# Patient Record
Sex: FEMALE | Race: WHITE | HISPANIC OR LATINO
[De-identification: names, ages, dates, MRNs, and addresses within clinical notes are randomized per-mention and may not be internally consistent; named-entity substitution may affect disease eponyms.]

---

## 2020-11-19 PROBLEM — Z00.00 ENCOUNTER FOR PREVENTIVE HEALTH EXAMINATION: Status: ACTIVE | Noted: 2020-11-19

## 2021-01-05 ENCOUNTER — APPOINTMENT (OUTPATIENT)
Dept: MATERNAL FETAL MEDICINE | Facility: CLINIC | Age: 36
End: 2021-01-05

## 2021-01-05 ENCOUNTER — APPOINTMENT (OUTPATIENT)
Dept: ANTEPARTUM | Facility: CLINIC | Age: 36
End: 2021-01-05

## 2021-01-13 ENCOUNTER — APPOINTMENT (OUTPATIENT)
Dept: OBGYN | Facility: CLINIC | Age: 36
End: 2021-01-13

## 2021-04-02 ENCOUNTER — APPOINTMENT (OUTPATIENT)
Dept: ANTEPARTUM | Facility: CLINIC | Age: 36
End: 2021-04-02
Payer: MEDICAID

## 2021-04-02 ENCOUNTER — LABORATORY RESULT (OUTPATIENT)
Age: 36
End: 2021-04-02

## 2021-04-02 ENCOUNTER — APPOINTMENT (OUTPATIENT)
Dept: MATERNAL FETAL MEDICINE | Facility: CLINIC | Age: 36
End: 2021-04-02
Payer: MEDICAID

## 2021-04-02 ENCOUNTER — NON-APPOINTMENT (OUTPATIENT)
Age: 36
End: 2021-04-02

## 2021-04-02 ENCOUNTER — ASOB RESULT (OUTPATIENT)
Age: 36
End: 2021-04-02

## 2021-04-02 ENCOUNTER — OUTPATIENT (OUTPATIENT)
Dept: OUTPATIENT SERVICES | Facility: HOSPITAL | Age: 36
LOS: 1 days | End: 2021-04-02
Payer: MEDICAID

## 2021-04-02 ENCOUNTER — TRANSCRIPTION ENCOUNTER (OUTPATIENT)
Age: 36
End: 2021-04-02

## 2021-04-02 VITALS
OXYGEN SATURATION: 100 % | HEART RATE: 101 BPM | DIASTOLIC BLOOD PRESSURE: 85 MMHG | SYSTOLIC BLOOD PRESSURE: 130 MMHG | WEIGHT: 157.25 LBS

## 2021-04-02 DIAGNOSIS — Z3A.24 24 WEEKS GESTATION OF PREGNANCY: ICD-10-CM

## 2021-04-02 DIAGNOSIS — O09.899 SUPERVISION OF OTHER HIGH RISK PREGNANCIES, UNSPECIFIED TRIMESTER: ICD-10-CM

## 2021-04-02 DIAGNOSIS — O10.012 PRE-EXISTING ESSENTIAL HYPERTENSION COMPLICATING PREGNANCY, SECOND TRIMESTER: ICD-10-CM

## 2021-04-02 DIAGNOSIS — O09.212 SUPERVISION OF PREGNANCY WITH HISTORY OF PRE-TERM LABOR, SECOND TRIMESTER: ICD-10-CM

## 2021-04-02 DIAGNOSIS — Z36.2 ENCOUNTER FOR OTHER ANTENATAL SCREENING FOLLOW-UP: ICD-10-CM

## 2021-04-02 LAB
BILIRUB UR QL STRIP: NORMAL
GLUCOSE UR-MCNC: NORMAL
HCG UR QL: 0.2 EU/DL
HGB UR QL STRIP.AUTO: NORMAL
KETONES UR-MCNC: 40
LEUKOCYTE ESTERASE UR QL STRIP: NORMAL
NITRITE UR QL STRIP: NORMAL
PH UR STRIP: 6
PROT UR STRIP-MCNC: NORMAL
SP GR UR STRIP: 1.02

## 2021-04-02 PROCEDURE — 76805 OB US >/= 14 WKS SNGL FETUS: CPT

## 2021-04-02 PROCEDURE — 86850 RBC ANTIBODY SCREEN: CPT

## 2021-04-02 PROCEDURE — 99203 OFFICE O/P NEW LOW 30 MIN: CPT | Mod: GC

## 2021-04-02 PROCEDURE — 76805 OB US >/= 14 WKS SNGL FETUS: CPT | Mod: 26

## 2021-04-02 PROCEDURE — 86765 RUBEOLA ANTIBODY: CPT

## 2021-04-02 PROCEDURE — 85027 COMPLETE CBC AUTOMATED: CPT

## 2021-04-02 PROCEDURE — 86901 BLOOD TYPING SEROLOGIC RH(D): CPT

## 2021-04-02 PROCEDURE — G0463: CPT

## 2021-04-02 PROCEDURE — 86900 BLOOD TYPING SEROLOGIC ABO: CPT

## 2021-04-02 PROCEDURE — 76817 TRANSVAGINAL US OBSTETRIC: CPT

## 2021-04-02 PROCEDURE — 76817 TRANSVAGINAL US OBSTETRIC: CPT | Mod: 26

## 2021-04-03 LAB
HCT VFR BLD CALC: 35.8 % — SIGNIFICANT CHANGE UP (ref 34.5–45)
HGB BLD-MCNC: 11.2 G/DL — LOW (ref 11.5–15.5)
MCHC RBC-ENTMCNC: 28 PG — SIGNIFICANT CHANGE UP (ref 27–34)
MCHC RBC-ENTMCNC: 31.3 GM/DL — LOW (ref 32–36)
MCV RBC AUTO: 89.5 FL — SIGNIFICANT CHANGE UP (ref 80–100)
MEV IGG SER-ACNC: >300 AU/ML — SIGNIFICANT CHANGE UP
MEV IGG+IGM SER-IMP: POSITIVE — SIGNIFICANT CHANGE UP
PLATELET # BLD AUTO: 234 K/UL — SIGNIFICANT CHANGE UP (ref 150–400)
RBC # BLD: 4 M/UL — SIGNIFICANT CHANGE UP (ref 3.8–5.2)
RBC # FLD: 14.7 % — HIGH (ref 10.3–14.5)
WBC # BLD: 6.93 K/UL — SIGNIFICANT CHANGE UP (ref 3.8–10.5)
WBC # FLD AUTO: 6.93 K/UL — SIGNIFICANT CHANGE UP (ref 3.8–10.5)

## 2021-04-04 ENCOUNTER — NON-APPOINTMENT (OUTPATIENT)
Age: 36
End: 2021-04-04

## 2021-04-05 ENCOUNTER — NON-APPOINTMENT (OUTPATIENT)
Age: 36
End: 2021-04-05

## 2021-04-06 ENCOUNTER — NON-APPOINTMENT (OUTPATIENT)
Age: 36
End: 2021-04-06

## 2021-04-14 DIAGNOSIS — O09.92 SUPERVISION OF HIGH RISK PREGNANCY, UNSPECIFIED, SECOND TRIMESTER: ICD-10-CM

## 2021-04-15 ENCOUNTER — NON-APPOINTMENT (OUTPATIENT)
Age: 36
End: 2021-04-15

## 2021-04-15 ENCOUNTER — APPOINTMENT (OUTPATIENT)
Dept: CARDIOLOGY | Facility: HOSPITAL | Age: 36
End: 2021-04-15

## 2021-04-15 VITALS
HEART RATE: 105 BPM | SYSTOLIC BLOOD PRESSURE: 133 MMHG | RESPIRATION RATE: 16 BRPM | WEIGHT: 157 LBS | OXYGEN SATURATION: 99 % | DIASTOLIC BLOOD PRESSURE: 82 MMHG | BODY MASS INDEX: 30.82 KG/M2 | HEIGHT: 60 IN

## 2021-04-15 DIAGNOSIS — Z82.49 FAMILY HISTORY OF ISCHEMIC HEART DISEASE AND OTHER DISEASES OF THE CIRCULATORY SYSTEM: ICD-10-CM

## 2021-04-15 DIAGNOSIS — E78.00 PURE HYPERCHOLESTEROLEMIA, UNSPECIFIED: ICD-10-CM

## 2021-04-15 DIAGNOSIS — Z83.42 FAMILY HISTORY OF FAMILIAL HYPERCHOLESTEROLEMIA: ICD-10-CM

## 2021-04-15 DIAGNOSIS — Z78.9 OTHER SPECIFIED HEALTH STATUS: ICD-10-CM

## 2021-04-15 RX ORDER — ASPIRIN ENTERIC COATED TABLETS 81 MG 81 MG/1
81 TABLET, DELAYED RELEASE ORAL DAILY
Refills: 0 | Status: ACTIVE | COMMUNITY

## 2021-04-15 RX ORDER — ELASTIC BANDAGE 2"X2.2YD
BANDAGE TOPICAL
Refills: 0 | Status: ACTIVE | COMMUNITY

## 2021-04-15 NOTE — ASSESSMENT
[FreeTextEntry1] : 35  F with Familial hypercholesteremia, hx of pre-eclampsia and cHTN presenting to establish care\par \par 1. FH: \par - strong suspicion given elevated , and family of hypercholesteremia in mother \par - currently in 2nd trimester of pregnancy and plans to breastfeed, will defer statin for now \par - recommended lipid screening of her first degree relatives- siblings and children. \par - refer to Lipid clinic with Dr. Aguirre\par \par 2. Hx of preeclampsia:\par /85 in office today. \par Monitor BP for now, no need for antihypertensives at this time\par \par F/U in clinic in 1 month.  \par

## 2021-04-15 NOTE — PHYSICAL EXAM
[General Appearance - Well Developed] : well developed [General Appearance - Well Nourished] : well nourished [Normal Conjunctiva] : the conjunctiva exhibited no abnormalities [Normal Oral Mucosa] : normal oral mucosa [Normal Oropharynx] : normal oropharynx [Heart Rate And Rhythm] : heart rate and rhythm were normal [Heart Sounds] : normal S1 and S2 [Respiration, Rhythm And Depth] : normal respiratory rhythm and effort [Auscultation Breath Sounds / Voice Sounds] : lungs were clear to auscultation bilaterally [Nail Clubbing] : no clubbing of the fingernails [Abnormal Walk] : normal gait [Skin Turgor] : normal skin turgor [] : no rash [Oriented To Time, Place, And Person] : oriented to person, place, and time [Affect] : the affect was normal [FreeTextEntry1] : no JVD

## 2021-04-21 ENCOUNTER — NON-APPOINTMENT (OUTPATIENT)
Age: 36
End: 2021-04-21

## 2021-04-23 ENCOUNTER — NON-APPOINTMENT (OUTPATIENT)
Age: 36
End: 2021-04-23

## 2021-04-23 ENCOUNTER — LABORATORY RESULT (OUTPATIENT)
Age: 36
End: 2021-04-23

## 2021-04-23 ENCOUNTER — OUTPATIENT (OUTPATIENT)
Dept: OUTPATIENT SERVICES | Facility: HOSPITAL | Age: 36
LOS: 1 days | End: 2021-04-23
Payer: MEDICAID

## 2021-04-23 ENCOUNTER — APPOINTMENT (OUTPATIENT)
Dept: MATERNAL FETAL MEDICINE | Facility: CLINIC | Age: 36
End: 2021-04-23
Payer: MEDICAID

## 2021-04-23 ENCOUNTER — ASOB RESULT (OUTPATIENT)
Age: 36
End: 2021-04-23

## 2021-04-23 ENCOUNTER — APPOINTMENT (OUTPATIENT)
Dept: ANTEPARTUM | Facility: CLINIC | Age: 36
End: 2021-04-23
Payer: MEDICAID

## 2021-04-23 VITALS
SYSTOLIC BLOOD PRESSURE: 142 MMHG | HEART RATE: 106 BPM | BODY MASS INDEX: 31.31 KG/M2 | OXYGEN SATURATION: 98 % | DIASTOLIC BLOOD PRESSURE: 82 MMHG | HEIGHT: 60 IN | WEIGHT: 159.5 LBS

## 2021-04-23 DIAGNOSIS — O09.899 SUPERVISION OF OTHER HIGH RISK PREGNANCIES, UNSPECIFIED TRIMESTER: ICD-10-CM

## 2021-04-23 DIAGNOSIS — O09.90 SUPERVISION OF HIGH RISK PREGNANCY, UNSPECIFIED, UNSPECIFIED TRIMESTER: ICD-10-CM

## 2021-04-23 PROCEDURE — 84550 ASSAY OF BLOOD/URIC ACID: CPT

## 2021-04-23 PROCEDURE — 90471 IMMUNIZATION ADMIN: CPT | Mod: NC

## 2021-04-23 PROCEDURE — 76816 OB US FOLLOW-UP PER FETUS: CPT | Mod: 26

## 2021-04-23 PROCEDURE — 81003 URINALYSIS AUTO W/O SCOPE: CPT

## 2021-04-23 PROCEDURE — 83615 LACTATE (LD) (LDH) ENZYME: CPT

## 2021-04-23 PROCEDURE — G0463: CPT

## 2021-04-23 PROCEDURE — 82950 GLUCOSE TEST: CPT

## 2021-04-23 PROCEDURE — 90471 IMMUNIZATION ADMIN: CPT

## 2021-04-23 PROCEDURE — 85027 COMPLETE CBC AUTOMATED: CPT

## 2021-04-23 PROCEDURE — 99212 OFFICE O/P EST SF 10 MIN: CPT | Mod: 25,GC

## 2021-04-23 PROCEDURE — 80053 COMPREHEN METABOLIC PANEL: CPT

## 2021-04-23 RX ADMIN — Medication 0 UNIT: at 00:00

## 2021-04-24 LAB
ALBUMIN SERPL ELPH-MCNC: 3.7 G/DL — SIGNIFICANT CHANGE UP (ref 3.3–5)
ALP SERPL-CCNC: 64 U/L — SIGNIFICANT CHANGE UP (ref 40–120)
ALT FLD-CCNC: 15 U/L — SIGNIFICANT CHANGE UP (ref 10–45)
ANION GAP SERPL CALC-SCNC: 13 MMOL/L — SIGNIFICANT CHANGE UP (ref 5–17)
AST SERPL-CCNC: 21 U/L — SIGNIFICANT CHANGE UP (ref 10–40)
BILIRUB SERPL-MCNC: <0.2 MG/DL — SIGNIFICANT CHANGE UP (ref 0.2–1.2)
BUN SERPL-MCNC: 6 MG/DL — LOW (ref 7–23)
CALCIUM SERPL-MCNC: 9.4 MG/DL — SIGNIFICANT CHANGE UP (ref 8.4–10.5)
CHLORIDE SERPL-SCNC: 105 MMOL/L — SIGNIFICANT CHANGE UP (ref 96–108)
CO2 SERPL-SCNC: 18 MMOL/L — LOW (ref 22–31)
CREAT SERPL-MCNC: 0.4 MG/DL — LOW (ref 0.5–1.3)
GLUCOSE 1H P MEAL SERPL-MCNC: 123 MG/DL — SIGNIFICANT CHANGE UP (ref 70–134)
GLUCOSE SERPL-MCNC: 124 MG/DL — HIGH (ref 70–99)
HCT VFR BLD CALC: 32.7 % — LOW (ref 34.5–45)
HGB BLD-MCNC: 10.2 G/DL — LOW (ref 11.5–15.5)
LDH SERPL L TO P-CCNC: 112 U/L — SIGNIFICANT CHANGE UP (ref 50–242)
MCHC RBC-ENTMCNC: 28.3 PG — SIGNIFICANT CHANGE UP (ref 27–34)
MCHC RBC-ENTMCNC: 31.2 GM/DL — LOW (ref 32–36)
MCV RBC AUTO: 90.6 FL — SIGNIFICANT CHANGE UP (ref 80–100)
PLATELET # BLD AUTO: 200 K/UL — SIGNIFICANT CHANGE UP (ref 150–400)
POTASSIUM SERPL-MCNC: 3.6 MMOL/L — SIGNIFICANT CHANGE UP (ref 3.5–5.3)
POTASSIUM SERPL-SCNC: 3.6 MMOL/L — SIGNIFICANT CHANGE UP (ref 3.5–5.3)
PROT SERPL-MCNC: 6.2 G/DL — SIGNIFICANT CHANGE UP (ref 6–8.3)
RBC # BLD: 3.61 M/UL — LOW (ref 3.8–5.2)
RBC # FLD: 15 % — HIGH (ref 10.3–14.5)
SODIUM SERPL-SCNC: 136 MMOL/L — SIGNIFICANT CHANGE UP (ref 135–145)
URATE SERPL-MCNC: 3.3 MG/DL — SIGNIFICANT CHANGE UP (ref 2.5–7)
WBC # BLD: 6.6 K/UL — SIGNIFICANT CHANGE UP (ref 3.8–10.5)
WBC # FLD AUTO: 6.6 K/UL — SIGNIFICANT CHANGE UP (ref 3.8–10.5)

## 2021-04-25 ENCOUNTER — NON-APPOINTMENT (OUTPATIENT)
Age: 36
End: 2021-04-25

## 2021-04-27 ENCOUNTER — APPOINTMENT (OUTPATIENT)
Dept: CARDIOLOGY | Facility: CLINIC | Age: 36
End: 2021-04-27
Payer: MEDICAID

## 2021-04-27 VITALS
RESPIRATION RATE: 16 BRPM | HEIGHT: 60 IN | OXYGEN SATURATION: 99 % | SYSTOLIC BLOOD PRESSURE: 130 MMHG | DIASTOLIC BLOOD PRESSURE: 80 MMHG | BODY MASS INDEX: 31.22 KG/M2 | WEIGHT: 159 LBS

## 2021-04-27 DIAGNOSIS — E78.1 PURE HYPERGLYCERIDEMIA: ICD-10-CM

## 2021-04-27 DIAGNOSIS — E78.5 HYPERLIPIDEMIA, UNSPECIFIED: ICD-10-CM

## 2021-04-27 PROBLEM — O09.90 HIGH RISK PREGNANCY, ANTEPARTUM: Status: RESOLVED | Noted: 2021-04-27 | Resolved: 2021-12-21

## 2021-04-27 PROCEDURE — 99072 ADDL SUPL MATRL&STAF TM PHE: CPT

## 2021-04-27 PROCEDURE — 99215 OFFICE O/P EST HI 40 MIN: CPT

## 2021-04-27 NOTE — PHYSICAL EXAM
[No Xanthelasma] : no xanthelasma [Normal] : clear lung fields, good air entry, no respiratory distress [No Skin Lesions] : no skin lesions

## 2021-04-27 NOTE — HISTORY OF PRESENT ILLNESS
[FreeTextEntry1] : Dear Christophe, \par \par I had the pleasure of seeing your patient JOSE GONZALES for a cardiometabolic/lipid consultation.\par \par As you know, She is a pleasant 35 year old female with a past family history of heart disease and a personal history of significant hypercholesterolemia -now in her third trimester of pregnancy\par ===============\par ===============\par 4/2021\par Labs on 12/2020 demonstrated: Total Cholesterol 360mg/dL HDL 58mg/dL  LDL 260mg/dL Triglycerides 211mg/dL  \par She is currently 28 weeks preganant.\par Was on lovastatin for 1 year - stopped once pregnant \par noted to have high chol for the last three years\par mother/father - high cholesterol on therapy \par no family history of premature ASCVD \par diet: no specific diet, just eating what she desires\par exercise: walks 3-4x week for 30-45min \par -----------------------\par -2021\par

## 2021-04-27 NOTE — DISCUSSION/SUMMARY
[FreeTextEntry1] : In summary,\par \par Pleasant 35 year old female with a past family history of heart disease and a personal history of significant hypercholesterolemia -now in her third trimester of pregnancy\par ===============\par ===============\par Presumed heterozygous FH type IIb now in third trimester pregnancy\par -Likely a candidate for cholestyramine; however, as she has 12 more weeks of pregnancy, will prefer to hold off on treatment at this point\par -Was counseled on remaining off statin therapy now and prior to any consideration of future pregnancies (although she does not intend to get pregnant in the future)\par -We will plan to reinitiate therapy after breast-feeding.\par -We will also plan to initiate genetic screening in family members at that time as well.\par -We will also plan to have her see our dietitian at the lipid center\par -We encourage continued exercise as she is doing 3-4 times a week\par -She will follow up with us in 6 months\par \par \par Christophe,as always, it was a pleasure to care for your patient in my office today.\par \par With kind thanks for the referral.\par \par Rajesh Aguirre MD [facility]\par Director, Preventive Cardiology & Lipidology\par [place] [facility]\par \par \par \par \par \par \par \par \par \par \par \par \par \par \par \par \par \par \par \par \par 40 minutes spent in patient encounter formulating plan and explaining all rationale for treatment plan\par \par

## 2021-04-28 ENCOUNTER — NON-APPOINTMENT (OUTPATIENT)
Age: 36
End: 2021-04-28

## 2021-04-30 RX ORDER — CHLORHEXIDINE GLUCONATE 4 %
325 (65 FE) LIQUID (ML) TOPICAL
Qty: 60 | Refills: 1 | Status: ACTIVE | COMMUNITY
Start: 2021-04-27 | End: 1900-01-01

## 2021-05-03 DIAGNOSIS — O09.90 SUPERVISION OF HIGH RISK PREGNANCY, UNSPECIFIED, UNSPECIFIED TRIMESTER: ICD-10-CM

## 2021-05-03 DIAGNOSIS — O09.219 SUPERVISION OF PREGNANCY WITH HISTORY OF PRE-TERM LABOR, UNSPECIFIED TRIMESTER: ICD-10-CM

## 2021-05-03 DIAGNOSIS — O47.00 FALSE LABOR BEFORE 37 COMPLETED WEEKS OF GESTATION, UNSPECIFIED TRIMESTER: ICD-10-CM

## 2021-05-03 DIAGNOSIS — O10.919 UNSPECIFIED PRE-EXISTING HYPERTENSION COMPLICATING PREGNANCY, UNSPECIFIED TRIMESTER: ICD-10-CM

## 2021-05-05 ENCOUNTER — NON-APPOINTMENT (OUTPATIENT)
Age: 36
End: 2021-05-05

## 2021-05-07 ENCOUNTER — NON-APPOINTMENT (OUTPATIENT)
Age: 36
End: 2021-05-07

## 2021-05-07 ENCOUNTER — OUTPATIENT (OUTPATIENT)
Dept: OUTPATIENT SERVICES | Facility: HOSPITAL | Age: 36
LOS: 1 days | End: 2021-05-07
Payer: MEDICAID

## 2021-05-07 ENCOUNTER — LABORATORY RESULT (OUTPATIENT)
Age: 36
End: 2021-05-07

## 2021-05-07 ENCOUNTER — APPOINTMENT (OUTPATIENT)
Dept: MATERNAL FETAL MEDICINE | Facility: CLINIC | Age: 36
End: 2021-05-07
Payer: MEDICAID

## 2021-05-07 VITALS
HEIGHT: 60 IN | SYSTOLIC BLOOD PRESSURE: 138 MMHG | DIASTOLIC BLOOD PRESSURE: 84 MMHG | BODY MASS INDEX: 32.2 KG/M2 | OXYGEN SATURATION: 97 % | HEART RATE: 105 BPM | WEIGHT: 164 LBS

## 2021-05-07 DIAGNOSIS — I10 ESSENTIAL (PRIMARY) HYPERTENSION: ICD-10-CM

## 2021-05-07 DIAGNOSIS — O09.899 SUPERVISION OF OTHER HIGH RISK PREGNANCIES, UNSPECIFIED TRIMESTER: ICD-10-CM

## 2021-05-07 LAB
BILIRUB UR QL STRIP: NORMAL
GLUCOSE UR-MCNC: NORMAL
HCG UR QL: 0.2 EU/DL
HGB UR QL STRIP.AUTO: NORMAL
KETONES UR-MCNC: NORMAL
LEUKOCYTE ESTERASE UR QL STRIP: NORMAL
NITRITE UR QL STRIP: NORMAL
PH UR STRIP: 7
PROT UR STRIP-MCNC: NORMAL
SP GR UR STRIP: 1.01

## 2021-05-07 PROCEDURE — 90715 TDAP VACCINE 7 YRS/> IM: CPT

## 2021-05-07 PROCEDURE — 85027 COMPLETE CBC AUTOMATED: CPT

## 2021-05-07 PROCEDURE — 84156 ASSAY OF PROTEIN URINE: CPT

## 2021-05-07 PROCEDURE — 80053 COMPREHEN METABOLIC PANEL: CPT

## 2021-05-07 PROCEDURE — 0502F SUBSEQUENT PRENATAL CARE: CPT

## 2021-05-07 PROCEDURE — 82570 ASSAY OF URINE CREATININE: CPT

## 2021-05-07 PROCEDURE — 36415 COLL VENOUS BLD VENIPUNCTURE: CPT

## 2021-05-07 PROCEDURE — G0463: CPT

## 2021-05-07 PROCEDURE — 81003 URINALYSIS AUTO W/O SCOPE: CPT

## 2021-05-07 PROCEDURE — 83615 LACTATE (LD) (LDH) ENZYME: CPT

## 2021-05-07 PROCEDURE — 90471 IMMUNIZATION ADMIN: CPT | Mod: NC

## 2021-05-07 PROCEDURE — 90471 IMMUNIZATION ADMIN: CPT

## 2021-05-08 LAB
ALBUMIN SERPL ELPH-MCNC: 4.1 G/DL — SIGNIFICANT CHANGE UP (ref 3.3–5)
ALP SERPL-CCNC: 80 U/L — SIGNIFICANT CHANGE UP (ref 40–120)
ALT FLD-CCNC: 19 U/L — SIGNIFICANT CHANGE UP (ref 10–45)
ANION GAP SERPL CALC-SCNC: 15 MMOL/L — SIGNIFICANT CHANGE UP (ref 5–17)
AST SERPL-CCNC: 23 U/L — SIGNIFICANT CHANGE UP (ref 10–40)
BILIRUB SERPL-MCNC: 0.2 MG/DL — SIGNIFICANT CHANGE UP (ref 0.2–1.2)
BUN SERPL-MCNC: 7 MG/DL — SIGNIFICANT CHANGE UP (ref 7–23)
CALCIUM SERPL-MCNC: 9.1 MG/DL — SIGNIFICANT CHANGE UP (ref 8.4–10.5)
CHLORIDE SERPL-SCNC: 103 MMOL/L — SIGNIFICANT CHANGE UP (ref 96–108)
CO2 SERPL-SCNC: 18 MMOL/L — LOW (ref 22–31)
CREAT ?TM UR-MCNC: 32 MG/DL — SIGNIFICANT CHANGE UP
CREAT SERPL-MCNC: 0.43 MG/DL — LOW (ref 0.5–1.3)
GLUCOSE SERPL-MCNC: 34 MG/DL — CRITICAL LOW (ref 70–99)
HCT VFR BLD CALC: 34.1 % — LOW (ref 34.5–45)
HGB BLD-MCNC: 10.8 G/DL — LOW (ref 11.5–15.5)
LDH SERPL L TO P-CCNC: 161 U/L — SIGNIFICANT CHANGE UP (ref 50–242)
MCHC RBC-ENTMCNC: 27.6 PG — SIGNIFICANT CHANGE UP (ref 27–34)
MCHC RBC-ENTMCNC: 31.7 GM/DL — LOW (ref 32–36)
MCV RBC AUTO: 87 FL — SIGNIFICANT CHANGE UP (ref 80–100)
PLATELET # BLD AUTO: 200 K/UL — SIGNIFICANT CHANGE UP (ref 150–400)
POTASSIUM SERPL-MCNC: 4 MMOL/L — SIGNIFICANT CHANGE UP (ref 3.5–5.3)
POTASSIUM SERPL-SCNC: 4 MMOL/L — SIGNIFICANT CHANGE UP (ref 3.5–5.3)
PROT ?TM UR-MCNC: 25 MG/DL — HIGH (ref 0–12)
PROT SERPL-MCNC: 6.6 G/DL — SIGNIFICANT CHANGE UP (ref 6–8.3)
PROT/CREAT UR-RTO: 0.8 RATIO — HIGH (ref 0–0.2)
RBC # BLD: 3.92 M/UL — SIGNIFICANT CHANGE UP (ref 3.8–5.2)
RBC # FLD: 14.6 % — HIGH (ref 10.3–14.5)
SODIUM SERPL-SCNC: 136 MMOL/L — SIGNIFICANT CHANGE UP (ref 135–145)
WBC # BLD: 7.35 K/UL — SIGNIFICANT CHANGE UP (ref 3.8–10.5)
WBC # FLD AUTO: 7.35 K/UL — SIGNIFICANT CHANGE UP (ref 3.8–10.5)

## 2021-05-09 ENCOUNTER — NON-APPOINTMENT (OUTPATIENT)
Age: 36
End: 2021-05-09

## 2021-05-10 ENCOUNTER — NON-APPOINTMENT (OUTPATIENT)
Age: 36
End: 2021-05-10

## 2021-05-11 ENCOUNTER — NON-APPOINTMENT (OUTPATIENT)
Age: 36
End: 2021-05-11

## 2021-05-12 ENCOUNTER — NON-APPOINTMENT (OUTPATIENT)
Age: 36
End: 2021-05-12

## 2021-05-14 ENCOUNTER — APPOINTMENT (OUTPATIENT)
Dept: ANTEPARTUM | Facility: CLINIC | Age: 36
End: 2021-05-14
Payer: MEDICAID

## 2021-05-14 ENCOUNTER — OUTPATIENT (OUTPATIENT)
Dept: OUTPATIENT SERVICES | Facility: HOSPITAL | Age: 36
LOS: 1 days | End: 2021-05-14
Payer: MEDICAID

## 2021-05-14 ENCOUNTER — NON-APPOINTMENT (OUTPATIENT)
Age: 36
End: 2021-05-14

## 2021-05-14 ENCOUNTER — APPOINTMENT (OUTPATIENT)
Dept: MATERNAL FETAL MEDICINE | Facility: CLINIC | Age: 36
End: 2021-05-14
Payer: MEDICAID

## 2021-05-14 ENCOUNTER — ASOB RESULT (OUTPATIENT)
Age: 36
End: 2021-05-14

## 2021-05-14 ENCOUNTER — INPATIENT (INPATIENT)
Facility: HOSPITAL | Age: 36
LOS: 10 days | Discharge: HOME | End: 2021-05-25
Attending: OBSTETRICS & GYNECOLOGY | Admitting: OBSTETRICS & GYNECOLOGY
Payer: MEDICAID

## 2021-05-14 VITALS
OXYGEN SATURATION: 97 % | DIASTOLIC BLOOD PRESSURE: 106 MMHG | BODY MASS INDEX: 32.59 KG/M2 | WEIGHT: 166 LBS | SYSTOLIC BLOOD PRESSURE: 178 MMHG | HEART RATE: 100 BPM | HEIGHT: 60 IN

## 2021-05-14 VITALS — OXYGEN SATURATION: 97 % | SYSTOLIC BLOOD PRESSURE: 159 MMHG | HEART RATE: 88 BPM | DIASTOLIC BLOOD PRESSURE: 90 MMHG

## 2021-05-14 DIAGNOSIS — Z3A.00 WEEKS OF GESTATION OF PREGNANCY NOT SPECIFIED: ICD-10-CM

## 2021-05-14 DIAGNOSIS — Z52.11 SKIN DONOR, AUTOLOGOUS: ICD-10-CM

## 2021-05-14 DIAGNOSIS — O26.899 OTHER SPECIFIED PREGNANCY RELATED CONDITIONS, UNSPECIFIED TRIMESTER: ICD-10-CM

## 2021-05-14 DIAGNOSIS — Z34.80 ENCOUNTER FOR SUPERVISION OF OTHER NORMAL PREGNANCY, UNSPECIFIED TRIMESTER: ICD-10-CM

## 2021-05-14 DIAGNOSIS — O09.899 SUPERVISION OF OTHER HIGH RISK PREGNANCIES, UNSPECIFIED TRIMESTER: ICD-10-CM

## 2021-05-14 LAB
ALBUMIN SERPL ELPH-MCNC: 3.4 G/DL — SIGNIFICANT CHANGE UP (ref 3.3–5)
ALBUMIN SERPL ELPH-MCNC: 3.5 G/DL — SIGNIFICANT CHANGE UP (ref 3.3–5)
ALP SERPL-CCNC: 74 U/L — SIGNIFICANT CHANGE UP (ref 40–120)
ALP SERPL-CCNC: 78 U/L — SIGNIFICANT CHANGE UP (ref 40–120)
ALT FLD-CCNC: 28 U/L — SIGNIFICANT CHANGE UP (ref 10–45)
ALT FLD-CCNC: 29 U/L — SIGNIFICANT CHANGE UP (ref 10–45)
ANION GAP SERPL CALC-SCNC: 16 MMOL/L — SIGNIFICANT CHANGE UP (ref 5–17)
ANION GAP SERPL CALC-SCNC: 17 MMOL/L — SIGNIFICANT CHANGE UP (ref 5–17)
APPEARANCE UR: CLEAR — SIGNIFICANT CHANGE UP
APTT BLD: 27.3 SEC — LOW (ref 27.5–35.5)
APTT BLD: 28.2 SEC — SIGNIFICANT CHANGE UP (ref 27.5–35.5)
AST SERPL-CCNC: 29 U/L — SIGNIFICANT CHANGE UP (ref 10–40)
AST SERPL-CCNC: 44 U/L — HIGH (ref 10–40)
BASOPHILS # BLD AUTO: 0.02 K/UL — SIGNIFICANT CHANGE UP (ref 0–0.2)
BASOPHILS # BLD AUTO: 0.03 K/UL — SIGNIFICANT CHANGE UP (ref 0–0.2)
BASOPHILS NFR BLD AUTO: 0.3 % — SIGNIFICANT CHANGE UP (ref 0–2)
BASOPHILS NFR BLD AUTO: 0.4 % — SIGNIFICANT CHANGE UP (ref 0–2)
BILIRUB SERPL-MCNC: 0.1 MG/DL — LOW (ref 0.2–1.2)
BILIRUB SERPL-MCNC: 0.1 MG/DL — LOW (ref 0.2–1.2)
BILIRUB UR QL STRIP: NORMAL
BILIRUB UR-MCNC: NEGATIVE — SIGNIFICANT CHANGE UP
BLD GP AB SCN SERPL QL: POSITIVE — SIGNIFICANT CHANGE UP
BUN SERPL-MCNC: 7 MG/DL — SIGNIFICANT CHANGE UP (ref 7–23)
BUN SERPL-MCNC: 8 MG/DL — SIGNIFICANT CHANGE UP (ref 7–23)
CALCIUM SERPL-MCNC: 8.4 MG/DL — SIGNIFICANT CHANGE UP (ref 8.4–10.5)
CALCIUM SERPL-MCNC: 9.6 MG/DL — SIGNIFICANT CHANGE UP (ref 8.4–10.5)
CHLORIDE SERPL-SCNC: 103 MMOL/L — SIGNIFICANT CHANGE UP (ref 96–108)
CHLORIDE SERPL-SCNC: 103 MMOL/L — SIGNIFICANT CHANGE UP (ref 96–108)
CO2 SERPL-SCNC: 16 MMOL/L — LOW (ref 22–31)
CO2 SERPL-SCNC: 17 MMOL/L — LOW (ref 22–31)
COLOR SPEC: COLORLESS — SIGNIFICANT CHANGE UP
COVID-19 SPIKE DOMAIN AB INTERP: POSITIVE
COVID-19 SPIKE DOMAIN ANTIBODY RESULT: >250 U/ML — HIGH
CREAT ?TM UR-MCNC: 28 MG/DL — SIGNIFICANT CHANGE UP
CREAT SERPL-MCNC: 0.39 MG/DL — LOW (ref 0.5–1.3)
CREAT SERPL-MCNC: 0.41 MG/DL — LOW (ref 0.5–1.3)
DIFF PNL FLD: NEGATIVE — SIGNIFICANT CHANGE UP
EOSINOPHIL # BLD AUTO: 0.08 K/UL — SIGNIFICANT CHANGE UP (ref 0–0.5)
EOSINOPHIL # BLD AUTO: 0.08 K/UL — SIGNIFICANT CHANGE UP (ref 0–0.5)
EOSINOPHIL NFR BLD AUTO: 1 % — SIGNIFICANT CHANGE UP (ref 0–6)
EOSINOPHIL NFR BLD AUTO: 1 % — SIGNIFICANT CHANGE UP (ref 0–6)
FIBRINOGEN PPP-MCNC: 804 MG/DL — HIGH (ref 290–520)
FIBRINOGEN PPP-MCNC: 822 MG/DL — HIGH (ref 290–520)
GLUCOSE BLDC GLUCOMTR-MCNC: 108 MG/DL — HIGH (ref 70–99)
GLUCOSE BLDC GLUCOMTR-MCNC: 91 MG/DL — SIGNIFICANT CHANGE UP (ref 70–99)
GLUCOSE SERPL-MCNC: 62 MG/DL — LOW (ref 70–99)
GLUCOSE SERPL-MCNC: 92 MG/DL — SIGNIFICANT CHANGE UP (ref 70–99)
GLUCOSE UR QL: NEGATIVE — SIGNIFICANT CHANGE UP
GLUCOSE UR-MCNC: NORMAL
HCG UR QL: 0.2 EU/DL
HCT VFR BLD CALC: 30.8 % — LOW (ref 34.5–45)
HCT VFR BLD CALC: 31.1 % — LOW (ref 34.5–45)
HGB BLD-MCNC: 10 G/DL — LOW (ref 11.5–15.5)
HGB BLD-MCNC: 10.2 G/DL — LOW (ref 11.5–15.5)
HGB UR QL STRIP.AUTO: NORMAL
IMM GRANULOCYTES NFR BLD AUTO: 1.1 % — SIGNIFICANT CHANGE UP (ref 0–1.5)
IMM GRANULOCYTES NFR BLD AUTO: 1.2 % — SIGNIFICANT CHANGE UP (ref 0–1.5)
INR BLD: 0.92 RATIO — SIGNIFICANT CHANGE UP (ref 0.88–1.16)
INR BLD: 0.97 RATIO — SIGNIFICANT CHANGE UP (ref 0.88–1.16)
KETONES UR-MCNC: NEGATIVE — SIGNIFICANT CHANGE UP
KETONES UR-MCNC: NORMAL
LDH SERPL L TO P-CCNC: 143 U/L — SIGNIFICANT CHANGE UP (ref 50–242)
LDH SERPL L TO P-CCNC: 303 U/L — HIGH (ref 50–242)
LEUKOCYTE ESTERASE UR QL STRIP: NORMAL
LEUKOCYTE ESTERASE UR-ACNC: NEGATIVE — SIGNIFICANT CHANGE UP
LYMPHOCYTES # BLD AUTO: 2.03 K/UL — SIGNIFICANT CHANGE UP (ref 1–3.3)
LYMPHOCYTES # BLD AUTO: 2.09 K/UL — SIGNIFICANT CHANGE UP (ref 1–3.3)
LYMPHOCYTES # BLD AUTO: 24.9 % — SIGNIFICANT CHANGE UP (ref 13–44)
LYMPHOCYTES # BLD AUTO: 27.2 % — SIGNIFICANT CHANGE UP (ref 13–44)
MAGNESIUM SERPL-MCNC: 4.4 MG/DL — HIGH (ref 1.6–2.6)
MCHC RBC-ENTMCNC: 27.5 PG — SIGNIFICANT CHANGE UP (ref 27–34)
MCHC RBC-ENTMCNC: 27.9 PG — SIGNIFICANT CHANGE UP (ref 27–34)
MCHC RBC-ENTMCNC: 32.5 GM/DL — SIGNIFICANT CHANGE UP (ref 32–36)
MCHC RBC-ENTMCNC: 32.8 GM/DL — SIGNIFICANT CHANGE UP (ref 32–36)
MCV RBC AUTO: 84.8 FL — SIGNIFICANT CHANGE UP (ref 80–100)
MCV RBC AUTO: 85 FL — SIGNIFICANT CHANGE UP (ref 80–100)
MONOCYTES # BLD AUTO: 0.31 K/UL — SIGNIFICANT CHANGE UP (ref 0–0.9)
MONOCYTES # BLD AUTO: 0.59 K/UL — SIGNIFICANT CHANGE UP (ref 0–0.9)
MONOCYTES NFR BLD AUTO: 3.8 % — SIGNIFICANT CHANGE UP (ref 2–14)
MONOCYTES NFR BLD AUTO: 7.7 % — SIGNIFICANT CHANGE UP (ref 2–14)
NEUTROPHILS # BLD AUTO: 4.81 K/UL — SIGNIFICANT CHANGE UP (ref 1.8–7.4)
NEUTROPHILS # BLD AUTO: 5.62 K/UL — SIGNIFICANT CHANGE UP (ref 1.8–7.4)
NEUTROPHILS NFR BLD AUTO: 62.6 % — SIGNIFICANT CHANGE UP (ref 43–77)
NEUTROPHILS NFR BLD AUTO: 68.8 % — SIGNIFICANT CHANGE UP (ref 43–77)
NITRITE UR QL STRIP: NORMAL
NITRITE UR-MCNC: NEGATIVE — SIGNIFICANT CHANGE UP
NRBC # BLD: 0 /100 WBCS — SIGNIFICANT CHANGE UP (ref 0–0)
NRBC # BLD: 0 /100 WBCS — SIGNIFICANT CHANGE UP (ref 0–0)
PH UR STRIP: 6.5
PH UR: 7 — SIGNIFICANT CHANGE UP (ref 5–8)
PLATELET # BLD AUTO: 180 K/UL — SIGNIFICANT CHANGE UP (ref 150–400)
PLATELET # BLD AUTO: 182 K/UL — SIGNIFICANT CHANGE UP (ref 150–400)
POTASSIUM SERPL-MCNC: 3.6 MMOL/L — SIGNIFICANT CHANGE UP (ref 3.5–5.3)
POTASSIUM SERPL-MCNC: 4.6 MMOL/L — SIGNIFICANT CHANGE UP (ref 3.5–5.3)
POTASSIUM SERPL-SCNC: 3.6 MMOL/L — SIGNIFICANT CHANGE UP (ref 3.5–5.3)
POTASSIUM SERPL-SCNC: 4.6 MMOL/L — SIGNIFICANT CHANGE UP (ref 3.5–5.3)
PROT ?TM UR-MCNC: 89 MG/DL — HIGH (ref 0–12)
PROT ?TM UR-MCNC: 97 MG/DL — HIGH (ref 0–12)
PROT SERPL-MCNC: 6.4 G/DL — SIGNIFICANT CHANGE UP (ref 6–8.3)
PROT SERPL-MCNC: 6.6 G/DL — SIGNIFICANT CHANGE UP (ref 6–8.3)
PROT UR STRIP-MCNC: 300
PROT UR-MCNC: 100 — SIGNIFICANT CHANGE UP
PROT/CREAT UR-RTO: 3.2 RATIO — HIGH (ref 0–0.2)
PROTHROM AB SERPL-ACNC: 11.1 SEC — SIGNIFICANT CHANGE UP (ref 10.6–13.6)
PROTHROM AB SERPL-ACNC: 11.7 SEC — SIGNIFICANT CHANGE UP (ref 10.6–13.6)
RBC # BLD: 3.63 M/UL — LOW (ref 3.8–5.2)
RBC # BLD: 3.66 M/UL — LOW (ref 3.8–5.2)
RBC # FLD: 14.2 % — SIGNIFICANT CHANGE UP (ref 10.3–14.5)
RBC # FLD: 14.3 % — SIGNIFICANT CHANGE UP (ref 10.3–14.5)
RH IG SCN BLD-IMP: NEGATIVE — SIGNIFICANT CHANGE UP
SARS-COV-2 IGG+IGM SERPL QL IA: >250 U/ML — HIGH
SARS-COV-2 IGG+IGM SERPL QL IA: POSITIVE
SARS-COV-2 RNA SPEC QL NAA+PROBE: SIGNIFICANT CHANGE UP
SODIUM SERPL-SCNC: 136 MMOL/L — SIGNIFICANT CHANGE UP (ref 135–145)
SODIUM SERPL-SCNC: 136 MMOL/L — SIGNIFICANT CHANGE UP (ref 135–145)
SP GR SPEC: 1.01 — LOW (ref 1.01–1.02)
SP GR UR STRIP: 1.02
URATE SERPL-MCNC: 4.5 MG/DL — SIGNIFICANT CHANGE UP (ref 2.5–7)
URATE SERPL-MCNC: 4.9 MG/DL — SIGNIFICANT CHANGE UP (ref 2.5–7)
UROBILINOGEN FLD QL: NEGATIVE — SIGNIFICANT CHANGE UP
WBC # BLD: 7.68 K/UL — SIGNIFICANT CHANGE UP (ref 3.8–10.5)
WBC # BLD: 8.16 K/UL — SIGNIFICANT CHANGE UP (ref 3.8–10.5)
WBC # FLD AUTO: 7.68 K/UL — SIGNIFICANT CHANGE UP (ref 3.8–10.5)
WBC # FLD AUTO: 8.16 K/UL — SIGNIFICANT CHANGE UP (ref 3.8–10.5)

## 2021-05-14 PROCEDURE — 81003 URINALYSIS AUTO W/O SCOPE: CPT

## 2021-05-14 PROCEDURE — 76816 OB US FOLLOW-UP PER FETUS: CPT | Mod: 26

## 2021-05-14 PROCEDURE — G0463: CPT

## 2021-05-14 PROCEDURE — 99221 1ST HOSP IP/OBS SF/LOW 40: CPT | Mod: 25

## 2021-05-14 PROCEDURE — 99213 OFFICE O/P EST LOW 20 MIN: CPT | Mod: GC,25

## 2021-05-14 RX ORDER — HEPARIN SODIUM 5000 [USP'U]/ML
5000 INJECTION INTRAVENOUS; SUBCUTANEOUS EVERY 12 HOURS
Refills: 0 | Status: DISCONTINUED | OUTPATIENT
Start: 2021-05-14 | End: 2021-05-14

## 2021-05-14 RX ORDER — MAGNESIUM SULFATE 500 MG/ML
4 VIAL (ML) INJECTION ONCE
Refills: 0 | Status: COMPLETED | OUTPATIENT
Start: 2021-05-14 | End: 2021-05-14

## 2021-05-14 RX ORDER — FOLIC ACID 0.8 MG
1 TABLET ORAL DAILY
Refills: 0 | Status: DISCONTINUED | OUTPATIENT
Start: 2021-05-14 | End: 2021-05-15

## 2021-05-14 RX ORDER — MAGNESIUM SULFATE 500 MG/ML
2 VIAL (ML) INJECTION
Qty: 40 | Refills: 0 | Status: DISCONTINUED | OUTPATIENT
Start: 2021-05-14 | End: 2021-05-16

## 2021-05-14 RX ORDER — NIFEDIPINE 30 MG
30 TABLET, EXTENDED RELEASE 24 HR ORAL DAILY
Refills: 0 | Status: DISCONTINUED | OUTPATIENT
Start: 2021-05-14 | End: 2021-05-15

## 2021-05-14 RX ORDER — LABETALOL HCL 100 MG
20 TABLET ORAL ONCE
Refills: 0 | Status: COMPLETED | OUTPATIENT
Start: 2021-05-14 | End: 2021-05-14

## 2021-05-14 RX ORDER — ASPIRIN/CALCIUM CARB/MAGNESIUM 324 MG
81 TABLET ORAL DAILY
Refills: 0 | Status: DISCONTINUED | OUTPATIENT
Start: 2021-05-14 | End: 2021-05-22

## 2021-05-14 RX ADMIN — Medication 50 GM/HR: at 16:14

## 2021-05-14 RX ADMIN — Medication 20 MILLIGRAM(S): at 20:30

## 2021-05-14 RX ADMIN — Medication 12 MILLIGRAM(S): at 18:20

## 2021-05-14 RX ADMIN — Medication 30 MILLIGRAM(S): at 16:14

## 2021-05-14 RX ADMIN — Medication 200 GRAM(S): at 16:21

## 2021-05-14 NOTE — OB PROVIDER TRIAGE NOTE - NSOBPROVIDERNOTE_OBGYN_ALL_OB_FT
The patient is a 34YO  @ 30.3wks presenting w/ elevated BP at clinic for r/o cHTN exacerbation vs. siPEC.   -HELLP Labs  -P/C ratio  -Monitor BP Q15min  -Anti-hypertensives PRN  -EFM + [place] cat 1  -Consider BP medications for elevated BP  -Assess for admission     Conrad pgy3 d/w Dr. Vickers

## 2021-05-14 NOTE — CONSULT NOTE ADULT - ASSESSMENT
34 yo  at 30 wks 1 day gestation admitted with concern for chronic hypertension with superimposed preeclampsia with severe features, clinically stable at this time    CHTN with SI PEC  - Patient with new onset elevated blood pressures: baseline 140s, now in 150s with occasional severe range. Patient is asymptomatic. Labs reviewed, slight increase in LFT with worsening proteinuria. PC ratio from .8 last week to 3.2 today.   - Recommend magnesium for seizure prophylaxis, initiate anti hypertensive medication, close fetal monitoring.     Fetus  - for NICU consult  - FHR reactive with reassuring fetal status  - Betamethasone for fetal lung maturation    MOD  - Plan for RCD at 34 weeks gestation, sooner if clinically indicated    Plan of care reviewed with patient who expressed understanding. All questions answered. Patient seen and counseled with Dr. Jc.    Vee Valle MD  Fellow, Maternal Fetal Medicine

## 2021-05-14 NOTE — OB PROVIDER TRIAGE NOTE - HISTORY OF PRESENT ILLNESS
The patient is a 34YO  @ 30.2wks PMH of cHTN sent in from [facility] for severe range BP. The patient was going for routine follow up and was asymptomatic at the time. The patient's BPs were 160s-170/90s. She had an ATU sono w/ BPP  and EFW 1509g (26%). She was sent in for monitoring. Earlier in the pregnancy she had a 24h urine protein that was 212. She was repeating 24hr urine protein but has not completed it. Her most recent HELLP Labs () were wnl and her P/C: 0.8. She said that over the past 2 weeks her BP have been higher than usual. She was taking Lisinopril outside of pregnancy, but has not been taking BP meds during the pregnancy. She usually runs in the 120s-30s/60s-80s. Over the past 2 weeks her BP have been 130s-140s/80s-90s. She has not followed with Cardio-OB and has an appointment for a TTE next week. She currently denies HA, dizziness, lightheadedness, CP, SOB, palpitations, Abd pain, VB, LOF, Ctx, Urinary/bowel symptoms. +FM.    PNC: cHTN (no meds)  ATU Sono (): Vtx, post placenta, AMAURY 10, PARSON  wt 1509g (26%)  OBHx:   : pLTCS @29wks sPEC -  demise @ 1 DOL  2011: rLTCS @34wks (PPROM @ 32wks)  GYN: Denies  PMH: cHTN  PSH: c/s x2  Meds: ASA, PNV, Fe  All: NKDA  Soc: Denies  Psych: Denies  FHx: Denies

## 2021-05-14 NOTE — OB PROVIDER TRIAGE NOTE - NSHPPHYSICALEXAM_GEN_ALL_CORE
Vital Signs Last 24 Hrs  T(C): --  T(F): --  HR: 96 (14 May 2021 11:40) (85 - 104)  BP: 145/78 (14 May 2021 11:40) (145/78 - 169/92)  BP(mean): --  RR: --  SpO2: 97% (14 May 2021 11:40) (97% - 99%)    EFM: , mod bruce, +accel, no decel  [place]: No Ctx

## 2021-05-14 NOTE — OB PROVIDER H&P - ASSESSMENT
The patient is a 34YO @ 30.3wks presenting w/ elevated BP at clinic for siPEC. Patient meets criteria for sPEC based on clinic BP and rising P/C 3.2 from 0.8.     siPEC  -BMZ (-) for FLM  -Mg (-) for new siPEC  -Monitor BP Q15min on L&D  -Procardia 30xl  -Cardio-OB consult  -Will need inpatient TTE (never scheduled outpatient)    MWB  -Reg diet  -LR@50 w/ Mg  -Hold HSQ until BP stable    FWB  -NST BID  -BPP Tues/Friday  -ATU Sono (): Vtx, post placenta, AMAURY 10, PARSON  wt 1509g (26%)    Msantandreu pgy3

## 2021-05-14 NOTE — OB PROVIDER H&P - HISTORY OF PRESENT ILLNESS
The patient is a 34YO  @ 30.2wks PMH of cHTN sent in from [facility] for severe range BP. The patient was going for routine follow up and was asymptomatic at the time. The patient's BPs were 160s-170/90s. She had an ATU sono w/ BPP  and EFW 1509g (26%). She was sent in for monitoring. Earlier in the pregnancy she had a 24h urine protein that was 212. She was repeating 24hr urine protein but has not completed it. Her most recent HELLP Labs () were wnl and her P/C: 0.8. She said that over the past 2 weeks her BP have been higher than usual. She was taking Lisinopril outside of pregnancy, but has not been taking BP meds during the pregnancy. She usually runs in the 120s-30s/60s-80s. Over the past 2 weeks her BP have been 130s-140s/80s-90s. She has not followed with Cardio-OB and has an appointment for a TTE next week. She currently denies HA, dizziness, lightheadedness, CP, SOB, palpitations, Abd pain, VB, LOF, Ctx, Urinary/bowel symptoms. +FM.    PNC: cHTN (no meds)  ATU Sono (): Vtx, post placenta, AMAURY 10, PARSON  wt 1509g (26%)  OBHx:   : pLTCS @29wks sPEC -  demise @ 1 DOL  2011: rLTCS @34wks (PPROM @ 32wks)  GYN: Denies  PMH: cHTN  PSH: c/s x2  Meds: ASA, PNV, Fe  All: NKDA  Soc: Denies  Psych: Denies  FHx: Denies

## 2021-05-14 NOTE — OB RN PATIENT PROFILE - HISTORY OF COVID-19 VACCINATION
I will STOP taking the medications listed below when I get home from the hospital:    ramipril 1.25 mg oral capsule  -- 1 cap(s) by mouth once a day
No

## 2021-05-14 NOTE — CONSULT NOTE ADULT - SUBJECTIVE AND OBJECTIVE BOX
MFM Note    Ms. Wade was seen today in clinic and sent to L&D for evaluation for elevated blood pressure. She denies any headache, vision changes, right upper quadrant pain. She had an increase in proteinuria noted at her last visit. She denies any contractions, vaginal bleeding, loss of fluid or decreased fetal movement. No lightheadedness, dizziness, shortness of breath, palpitations.     PMH: Chronic Hypertension, treated with lisinopril prior to pregnancy  POBGYN:  primary CD at 29 weeks due to severe preeclampsia,  demise at day of life 1  G2: repeat CD at 34 weeks (patient PPROM'd at 32 wks)  Denies GYN history.  PSurgHx: CD x2  Meds: PNV, ASA, Iron, Folic Acid  Allergies NKDA    ICU Vital Signs Last 24 Hrs  T(C): --  T(F): --  HR: 112 (14 May 2021 16:26) (83 - 117)  BP: 139/78 (14 May 2021 16:26) (139/78 - 170/95)  BP(mean): --  ABP: --  ABP(mean): --  RR: --  SpO2: 96% (14 May 2021 16:25) (88% - 100%)  Gen: NAD  Resp: Unlabored  Abd: Soft, gravid, nontender  FHR 140s bpm, mod bruce, + accels,   [place]: None    ATU Sono today:  VTX, post placenta, AMAURY 10, EFW 1509 g, 26th%tile                          10.0   7.68  )-----------( 180      ( 14 May 2021 11:32 )             30.8       136  |  103  |  8   ----------------------------<  62<L>  4.6   |  16<L>  |  0.39<L>    Ca    9.6      14 May 2021 11:32    TPro  6.6  /  Alb  3.4  /  TBili  0.1<L>  /  DBili  x   /  AST  44<H>  /  ALT  28  /  AlkPhos  74  05-14    Urinalysis Basic - ( 14 May 2021 11:31 )    Color: Colorless / Appearance: Clear / S.007 / pH: x  Gluc: x / Ketone: Negative  / Bili: Negative / Urobili: Negative   Blood: x / Protein: 100 / Nitrite: Negative   Leuk Esterase: Negative / RBC: x / WBC x   Sq Epi: x / Non Sq Epi: x / Bacteria: x

## 2021-05-14 NOTE — OB PROVIDER H&P - NSHPPHYSICALEXAM_GEN_ALL_CORE
Vital Signs Last 24 Hrs  T(C): --  T(F): --  HR: 120 (14 May 2021 16:55) (83 - 124)  BP: 153/83 (14 May 2021 16:55) (139/78 - 170/95)  BP(mean): --  RR: --  SpO2: 96% (14 May 2021 16:55) (88% - 100%)    EFM: , mod bruce, +accel, no decel  [place]: No Ctx

## 2021-05-14 NOTE — CONSULT NOTE ADULT - ATTENDING COMMENTS
34 yo  at 30 wks 1 day gestation admitted with concern for chronic hypertension with superimposed preeclampsia with severe features due to severe range BPs 15 min apart and worsening proteinuria  Labs reviewed, increase in LFTs (44/28) with AST now double her baseline, Hb 10.0 and P/C 3.2 PC (0.8 last week).  Patient denies headaches, vision changes, epigastric, RUQ pain, N/V  Recommend magnesium for seizure prophylaxis  Initiate anti hypertensive medication procardia 30 XL  Repeat labs in am with iron studies and collection of 24h urine.  Fetus:  BTM x 2 for fetal lung maturation  NICU consult  Continuous monitoring for now as there appear to be 2 areas where we are unable to determine loss of contact vs fetal deceleration. Otherwise reassuring tracing. If stable can then transition to NST BID  US today with appropriate growth EFW 26th percentile, 1509g AMAURY: 10, cephalic, BPP 8/8  BPP Twice weekly while in patient M/F  Delivery at 34 weeks, sooner if clinically indicated by RLTCS  Cardioobstetrics consultation recommend with maternal echo.  Due to glucose in the 60s today and previously noted 34 as outpatient, will perform serial fingersticks during current admission and d/c if WNL. .   Contraception: Will discuss permanent sterilization but per clinic note patient desires postpartum depo.     Dr Hosea Bowie I was present and participated in all key portions of assessment and plan.   Agree with fellow's note. 34 yo  at 30 wks 3 day gestation admitted with concern for chronic hypertension with superimposed preeclampsia with severe features due to severe range BPs 15 min apart and worsening proteinuria  Labs reviewed, increase in LFTs (44/28) with AST now double her baseline, Hb 10.0 and P/C 3.2 PC (0.8 last week).  Patient denies headaches, vision changes, epigastric, RUQ pain, N/V  Recommend magnesium for seizure prophylaxis  Initiate anti hypertensive medication procardia 30 XL  Repeat labs in am with iron studies and collection of 24h urine.  Fetus:  BTM x 2 for fetal lung maturation  NICU consult  Continuous monitoring for now as there appear to be 2 areas where we are unable to determine loss of contact vs fetal deceleration. Otherwise reassuring tracing. If stable can then transition to NST BID  US today with appropriate growth EFW 26th percentile, 1509g AMAURY: 10, cephalic, BPP 8/8  BPP Twice weekly while in patient M/F  Delivery at 34 weeks, sooner if clinically indicated by RLTCS  Cardioobstetrics consultation recommend with maternal echo.  Due to glucose in the 60s today and previously noted 34 as outpatient, will perform serial fingersticks during current admission and d/c if WNL. .   Contraception: Will discuss permanent sterilization but per clinic note patient desires postpartum depo.     Dr Hosea Bowie I was present and participated in all key portions of assessment and plan.   Agree with fellow's note.

## 2021-05-14 NOTE — OB PROVIDER H&P - NSSCHADMISSION_OBGYN_A_OB
----- Message from Vivi Chand MD sent at 2/9/2019 10:30 PM CST -----  Regarding: Appt/BP follow up  Please contact patient to schedule BP follow up. I sent Rx for norvasc to [facility] as she did not respond with preferred pharmacy.  
Called pt, no answer, no vm option     
Spoke with pt appt scheduled for 2/28/19 at 140pn.    
No

## 2021-05-15 LAB
ALBUMIN SERPL ELPH-MCNC: 3.7 G/DL — SIGNIFICANT CHANGE UP (ref 3.3–5)
ALP SERPL-CCNC: 79 U/L — SIGNIFICANT CHANGE UP (ref 40–120)
ALT FLD-CCNC: 26 U/L — SIGNIFICANT CHANGE UP (ref 10–45)
ANION GAP SERPL CALC-SCNC: 15 MMOL/L — SIGNIFICANT CHANGE UP (ref 5–17)
APTT BLD: 26.7 SEC — LOW (ref 27.5–35.5)
AST SERPL-CCNC: 26 U/L — SIGNIFICANT CHANGE UP (ref 10–40)
BASOPHILS # BLD AUTO: 0.01 K/UL — SIGNIFICANT CHANGE UP (ref 0–0.2)
BASOPHILS NFR BLD AUTO: 0.1 % — SIGNIFICANT CHANGE UP (ref 0–2)
BILIRUB SERPL-MCNC: 0.1 MG/DL — LOW (ref 0.2–1.2)
BUN SERPL-MCNC: 10 MG/DL — SIGNIFICANT CHANGE UP (ref 7–23)
CALCIUM SERPL-MCNC: 7.9 MG/DL — LOW (ref 8.4–10.5)
CHLORIDE SERPL-SCNC: 102 MMOL/L — SIGNIFICANT CHANGE UP (ref 96–108)
CO2 SERPL-SCNC: 16 MMOL/L — LOW (ref 22–31)
CREAT SERPL-MCNC: 0.42 MG/DL — LOW (ref 0.5–1.3)
EOSINOPHIL # BLD AUTO: 0 K/UL — SIGNIFICANT CHANGE UP (ref 0–0.5)
EOSINOPHIL NFR BLD AUTO: 0 % — SIGNIFICANT CHANGE UP (ref 0–6)
FIBRINOGEN PPP-MCNC: 812 MG/DL — HIGH (ref 290–520)
GLUCOSE BLDC GLUCOMTR-MCNC: 102 MG/DL — HIGH (ref 70–99)
GLUCOSE BLDC GLUCOMTR-MCNC: 114 MG/DL — HIGH (ref 70–99)
GLUCOSE SERPL-MCNC: 102 MG/DL — HIGH (ref 70–99)
HCT VFR BLD CALC: 31.7 % — LOW (ref 34.5–45)
HGB BLD-MCNC: 10.2 G/DL — LOW (ref 11.5–15.5)
HIV 1+2 AB+HIV1 P24 AG SERPL QL IA: SIGNIFICANT CHANGE UP
IMM GRANULOCYTES NFR BLD AUTO: 1.6 % — HIGH (ref 0–1.5)
INR BLD: 0.92 RATIO — SIGNIFICANT CHANGE UP (ref 0.88–1.16)
LDH SERPL L TO P-CCNC: 146 U/L — SIGNIFICANT CHANGE UP (ref 50–242)
LYMPHOCYTES # BLD AUTO: 1.37 K/UL — SIGNIFICANT CHANGE UP (ref 1–3.3)
LYMPHOCYTES # BLD AUTO: 13.7 % — SIGNIFICANT CHANGE UP (ref 13–44)
MAGNESIUM SERPL-MCNC: 5.2 MG/DL — HIGH (ref 1.6–2.6)
MAGNESIUM SERPL-MCNC: 5.6 MG/DL — HIGH (ref 1.6–2.6)
MCHC RBC-ENTMCNC: 27.3 PG — SIGNIFICANT CHANGE UP (ref 27–34)
MCHC RBC-ENTMCNC: 32.2 GM/DL — SIGNIFICANT CHANGE UP (ref 32–36)
MCV RBC AUTO: 84.8 FL — SIGNIFICANT CHANGE UP (ref 80–100)
MONOCYTES # BLD AUTO: 0.25 K/UL — SIGNIFICANT CHANGE UP (ref 0–0.9)
MONOCYTES NFR BLD AUTO: 2.5 % — SIGNIFICANT CHANGE UP (ref 2–14)
NEUTROPHILS # BLD AUTO: 8.22 K/UL — HIGH (ref 1.8–7.4)
NEUTROPHILS NFR BLD AUTO: 82.1 % — HIGH (ref 43–77)
NRBC # BLD: 0 /100 WBCS — SIGNIFICANT CHANGE UP (ref 0–0)
PLATELET # BLD AUTO: 182 K/UL — SIGNIFICANT CHANGE UP (ref 150–400)
POTASSIUM SERPL-MCNC: 4.2 MMOL/L — SIGNIFICANT CHANGE UP (ref 3.5–5.3)
POTASSIUM SERPL-SCNC: 4.2 MMOL/L — SIGNIFICANT CHANGE UP (ref 3.5–5.3)
PROT SERPL-MCNC: 6.7 G/DL — SIGNIFICANT CHANGE UP (ref 6–8.3)
PROTHROM AB SERPL-ACNC: 11.1 SEC — SIGNIFICANT CHANGE UP (ref 10.6–13.6)
RBC # BLD: 3.74 M/UL — LOW (ref 3.8–5.2)
RBC # FLD: 14.6 % — HIGH (ref 10.3–14.5)
SODIUM SERPL-SCNC: 133 MMOL/L — LOW (ref 135–145)
T PALLIDUM AB TITR SER: NEGATIVE — SIGNIFICANT CHANGE UP
URATE SERPL-MCNC: 5.3 MG/DL — SIGNIFICANT CHANGE UP (ref 2.5–7)
WBC # BLD: 10.01 K/UL — SIGNIFICANT CHANGE UP (ref 3.8–10.5)
WBC # FLD AUTO: 10.01 K/UL — SIGNIFICANT CHANGE UP (ref 3.8–10.5)

## 2021-05-15 PROCEDURE — 99232 SBSQ HOSP IP/OBS MODERATE 35: CPT | Mod: 25

## 2021-05-15 PROCEDURE — 93306 TTE W/DOPPLER COMPLETE: CPT | Mod: 26

## 2021-05-15 RX ORDER — ERTAPENEM SODIUM 1 G/1
1000 INJECTION, POWDER, LYOPHILIZED, FOR SOLUTION INTRAMUSCULAR; INTRAVENOUS EVERY 24 HOURS
Refills: 0 | Status: DISCONTINUED | OUTPATIENT
Start: 2021-05-15 | End: 2021-05-15

## 2021-05-15 RX ORDER — DIPHENHYDRAMINE HCL 50 MG
25 CAPSULE ORAL ONCE
Refills: 0 | Status: COMPLETED | OUTPATIENT
Start: 2021-05-15 | End: 2021-05-15

## 2021-05-15 RX ORDER — ACETAMINOPHEN 500 MG
975 TABLET ORAL ONCE
Refills: 0 | Status: COMPLETED | OUTPATIENT
Start: 2021-05-15 | End: 2021-05-15

## 2021-05-15 RX ORDER — ACETAMINOPHEN 500 MG
975 TABLET ORAL ONCE
Refills: 0 | Status: DISCONTINUED | OUTPATIENT
Start: 2021-05-15 | End: 2021-05-15

## 2021-05-15 RX ORDER — NIFEDIPINE 30 MG
60 TABLET, EXTENDED RELEASE 24 HR ORAL DAILY
Refills: 0 | Status: DISCONTINUED | OUTPATIENT
Start: 2021-05-15 | End: 2021-05-25

## 2021-05-15 RX ORDER — SODIUM CHLORIDE 9 MG/ML
1000 INJECTION, SOLUTION INTRAVENOUS
Refills: 0 | Status: DISCONTINUED | OUTPATIENT
Start: 2021-05-15 | End: 2021-05-15

## 2021-05-15 RX ORDER — ACETAMINOPHEN 500 MG
975 TABLET ORAL EVERY 6 HOURS
Refills: 0 | Status: DISCONTINUED | OUTPATIENT
Start: 2021-05-15 | End: 2021-05-18

## 2021-05-15 RX ORDER — SODIUM CHLORIDE 9 MG/ML
1000 INJECTION, SOLUTION INTRAVENOUS ONCE
Refills: 0 | Status: DISCONTINUED | OUTPATIENT
Start: 2021-05-15 | End: 2021-05-15

## 2021-05-15 RX ORDER — HEPARIN SODIUM 5000 [USP'U]/ML
5000 INJECTION INTRAVENOUS; SUBCUTANEOUS EVERY 12 HOURS
Refills: 0 | Status: DISCONTINUED | OUTPATIENT
Start: 2021-05-15 | End: 2021-05-17

## 2021-05-15 RX ORDER — METOCLOPRAMIDE HCL 10 MG
10 TABLET ORAL ONCE
Refills: 0 | Status: COMPLETED | OUTPATIENT
Start: 2021-05-15 | End: 2021-05-15

## 2021-05-15 RX ADMIN — Medication 975 MILLIGRAM(S): at 02:01

## 2021-05-15 RX ADMIN — Medication 25 MILLIGRAM(S): at 02:01

## 2021-05-15 RX ADMIN — Medication 975 MILLIGRAM(S): at 10:01

## 2021-05-15 RX ADMIN — Medication 60 MILLIGRAM(S): at 17:06

## 2021-05-15 RX ADMIN — Medication 12 MILLIGRAM(S): at 17:55

## 2021-05-15 NOTE — PROGRESS NOTE ADULT - ASSESSMENT
A/P: 35y/p  at 30w4d admitted with siPEC by severe range BPs and worsening proteinuria. Overnight patient received 20 IVP labetalol. BPs controlled at this time. Patient asymptomatic. Maternal and fetus status reassuring.    # siPEC  - c/w Mag x 24 hours for seizure ppx  - s/p Lab 20 IVP  - c/w Procardia XL 30 QD  - f/u 24 hour urine protein  - TTE ordered  - Cardio-OB consult Monday  - c/w BMZ (-) for FLM    #Fetal well being  - Cont monitoring while on Mag then NST BID  - c/w BMZ as above  - NICU consult called  - BPP 2x/week  - ATU Sono (): Vtx, post placenta, AMAURY 10, PARSON 8/ wt 1509g (26%)  - f/u GBS ()    #Maternal well being  - Reg diet  - LR@50 while on Mag then SLIV  - SCDs, ambulation for DVT ppx – HSQ once stable    #MOD  - Prior C/S x2  - MOD: Repeat C/S at 34 weeks or sooner PRN maternal or fetal concerns  - BCM: desires Anita Peterson PGY3

## 2021-05-15 NOTE — PROGRESS NOTE ADULT - SUBJECTIVE AND OBJECTIVE BOX
R3 Antepartum Note, HD#2    Interval events: none.    Patient seen and examined at bedside, no acute overnight events. No acute complaints. Pt reports +FM, denies LOF, VB, CTX, HA, epigastric pain, blurred vision, CP, SOB, N/V, fevers, and chills.    Vital Signs Last 24 Hours  T(C): 36.6 (05-15-21 @ 00:19), Max: 36.9 (05-14-21 @ 20:14)  HR: 116 (05-15-21 @ 01:19) (83 - 126)  BP: 125/70 (05-15-21 @ 01:08) (124/66 - 170/97)  RR: 18 (05-14-21 @ 17:09) (18 - 18)  SpO2: 96% (05-15-21 @ 01:19) (88% - 100%)    CAPILLARY BLOOD GLUCOSE      POCT Blood Glucose.: 108 mg/dL (14 May 2021 23:23)  POCT Blood Glucose.: 91 mg/dL (14 May 2021 18:15)      Physical Exam:  General: NAD  Abdomen: Soft, non-tender, gravid  Ext: No pain or swelling    NST reactive overnight    Labs:             10.2   8.16  )-----------( 182      ( 05-14 @ 20:38 )             31.1     05-14 @ 20:37    136  |  103  |  7   ----------------------------<  92  3.6   |  17  |  0.41    Ca    8.4      05-14 @ 20:37  Mg     4.4     05-14 @ 20:37    TPro  6.4  /  Alb  3.5  /  TBili  0.1  /  DBili  x   /  AST  29  /  ALT  29  /  AlkPhos  78  05-14 @ 20:37    PT/INR - ( 05-14 @ 20:38 )   PT: 11.1 sec;   INR: 0.92 ratio    PTT - ( 05-14 @ 20:38 )  PTT:27.3 sec    Uric Acid: (05-14 @ 20:38)  --       Fibrinogen: (05-14 @ 20:38)  804      LDH: (05-14 @ 20:38)  --         MEDICATIONS  (STANDING):  acetaminophen   Tablet .. 975 milliGRAM(s) Oral once  aspirin enteric coated 81 milliGRAM(s) Oral daily  betamethasone Injectable 12 milliGRAM(s) IntraMuscular daily  diphenhydrAMINE   Injectable 25 milliGRAM(s) IV Push once  folic acid 1 milliGRAM(s) Oral daily  magnesium sulfate Infusion 2 Gm/Hr (50 mL/Hr) IV Continuous <Continuous>  NIFEdipine XL 30 milliGRAM(s) Oral daily  prenatal multivitamin 1 Tablet(s) Oral daily    MEDICATIONS  (PRN):

## 2021-05-15 NOTE — PROGRESS NOTE ADULT - ATTENDING COMMENTS
34 yo  at 30 wks 4 day gestation admitted with concern for chronic hypertension with superimposed preeclampsia with severe features due to severe range BPs and worsening proteinuria  Labs from admission with increase in LFTs (44/28) with AST double her baseline, Hb 10.0, UA: 5.3 and P/C 3.2 PC (0.8 last week). LFTS today WNL rest otherwise stable. 24hurine protein in progress.   Patient reports headache which she describes frontal likely related to sinus congestion, no vision changes, epigastric, RUQ pain, N/V  On magnesium for seizure prophylaxis and hypertensive medication: procardia 30 XL  Bps: 130s-140s/60-80s.   Fetus:  BTM -05/15  S/p NICU consult  Monitoring reassuring overnight. Reactive.   US  with appropriate growth EFW 26th percentile, 1509g AMAURY: 10, cephalic, BPP /8  Plan:   Will treat headache and nasal congestion and reevaluate. If improved, will d/c MgSo4 and continue assessments of maternal symptoms while inpatient.  Repeat Labs Q 3 days with iron studies at her next blood draw due to anemia. Complete 24h urine protein. Proteinuria at this time likely from preeclampsia. Will f/u on results to determine need for nephrology consultation.   Continue procardia 30 XL with titration as needed for Bps >150s/100s. Monitor BPs.   BPP Twice weekly while in patient M/F. NST BID  Continue in patient management until delivery. Delivery at 34 weeks, sooner if clinically indicated by RLTCS  Cardioobstetrics consultation recommend with maternal echo.  Contraception: Will discuss permanent sterilization but per clinic note patient desires postpartum depo.     Dr Hosea Bowie I was present and participated in all key portions of assessment and plan.   Agree with resident's note.

## 2021-05-15 NOTE — PROGRESS NOTE ADULT - ASSESSMENT
34 yo  at 30 wks 4 days gestation admitted with chronic hypertension with superimposed preeclampsia with severe features, clinically stable at this time    - S/p IV Push of Labetalol 20mg yesterday. Started on Procardia 30mg. Blood pressures 130s-140s/70s/90s. Patient denies any symptoms. Warning signs reviewed. Elevated PCR suggestive of proteinuria. LFT normalized. Follow up TTE.     Fetus: . FHR reactive with reassuring fetal status  - BMZ x2 for fetal lung maturation    MOD: repeat CD at 34 weeks or sooner as indicated.    Appreciate MFM consultation.     Vee Valle MD  OB

## 2021-05-15 NOTE — PROGRESS NOTE ADULT - SUBJECTIVE AND OBJECTIVE BOX
OB  Note    Ms. Wade was seen and evlauated at bedside. HSe has a mild headache, sinus pressure that started after her COVID swab. She denies any vision changes, right upper quadrant pain. Her headache improves with tylenol. No contractions, loss of fluid, vaginal bleeding or decreased fetal movement.     ICU Vital Signs Last 24 Hrs  T(C): 36.7 (15 May 2021 10:20), Max: 37.0 (15 May 2021 02:00)  T(F): 98.06 (15 May 2021 10:20), Max: 98.6 (15 May 2021 02:00)  HR: 110 (15 May 2021 11:31) (83 - 128)  BP: 155/84 (15 May 2021 11:31) (124/66 - 170/97)  BP(mean): --  ABP: --  ABP(mean): --  RR: 16 (15 May 2021 10:20) (16 - 18)  SpO2: 98% (15 May 2021 11:28) (88% - 100%)    GEN: NAD  Resp: Unlabored  Abd: Soft, nontender, no rebound or guarding, gravid  FHR: 130 bl, mod bruce,  + acceels - decels  [place]: None                          10.2   10.01 )-----------( 182      ( 15 May 2021 08:45 )             31.7   05-15    133<L>  |  102  |  10  ----------------------------<  102<H>  4.2   |  16<L>  |  0.42<L>    Ca    7.9<L>      15 May 2021 08:45  Mg     5.6     05-15    TPro  6.7  /  Alb  3.7  /  TBili  0.1<L>  /  DBili  x   /  AST  26  /  ALT  26  /  AlkPhos  79  05-15

## 2021-05-16 LAB
COLLECT DURATION TIME UR: 24 HR — SIGNIFICANT CHANGE UP
PROT 24H UR-MRATE: 4065 MG/24 H — HIGH (ref 50–100)
TOTAL VOLUME - 24 HOUR: 2525 ML — SIGNIFICANT CHANGE UP
URINE CREATININE CALCULATION: 1.4 G/24 H — SIGNIFICANT CHANGE UP (ref 0.8–1.8)

## 2021-05-16 PROCEDURE — 99232 SBSQ HOSP IP/OBS MODERATE 35: CPT | Mod: 25

## 2021-05-16 RX ORDER — FAMOTIDINE 10 MG/ML
20 INJECTION INTRAVENOUS DAILY
Refills: 0 | Status: DISCONTINUED | OUTPATIENT
Start: 2021-05-16 | End: 2021-05-25

## 2021-05-16 RX ORDER — FOLIC ACID 0.8 MG
1 TABLET ORAL DAILY
Refills: 0 | Status: DISCONTINUED | OUTPATIENT
Start: 2021-05-16 | End: 2021-05-23

## 2021-05-16 RX ADMIN — Medication 81 MILLIGRAM(S): at 12:29

## 2021-05-16 RX ADMIN — Medication 1 TABLET(S): at 13:48

## 2021-05-16 RX ADMIN — Medication 60 MILLIGRAM(S): at 17:54

## 2021-05-16 RX ADMIN — Medication 1 MILLIGRAM(S): at 13:47

## 2021-05-16 RX ADMIN — HEPARIN SODIUM 5000 UNIT(S): 5000 INJECTION INTRAVENOUS; SUBCUTANEOUS at 10:40

## 2021-05-16 RX ADMIN — HEPARIN SODIUM 5000 UNIT(S): 5000 INJECTION INTRAVENOUS; SUBCUTANEOUS at 21:56

## 2021-05-16 NOTE — PROGRESS NOTE ADULT - ASSESSMENT
34 y/o  at 30w5d admitted with siPEC by severe range BPs and worsening proteinuria. Patient with tachycardia to 110s-120s, asymptomatic at this time. BPs well-controlled. Patient denies symptoms of preeclampsia.     #siPEC  - s/p Mag x 24 hours for seizure ppx  - s/p Lab 20 IVP (5/15)   - c/w Procardia XL 30 QD  - TTE (5/15): EF 75%, hyperdynamic left ventricle   - Cardio-OB consult Monday  - s/p BMZ (-5/15) for FLM    #Fetal well being  - NST BID   - s/p BMZ (-5/15) for FLM   - Appreciate NICU consult   - BPP 2x/week  - ATU Sono (): Vtx, post placenta, AMAURY 10, PARSON / wt 1509g (26%)  - f/u GBS ()    #Maternal well being  - Reg diet  - LR@50 while on Mag then SLIV  - SCDs, ambulation for DVT ppx – HSQ once stable    #MOD  - Prior C/S x2  - MOD: Repeat C/S at 34 weeks or sooner PRN maternal or fetal concerns  - BCM: desires Depo   34 y/o  at 30w5d admitted with siPEC by severe range BPs and worsening proteinuria. Patient with tachycardia to 110s-120s, asymptomatic at this time. BPs well-controlled. Patient denies symptoms of preeclampsia.     #siPEC  - s/p Mag x 24 hours for seizure ppx  - s/p Lab 20 IVP (5/15)   - c/w Procardia XL 30 QD  - TTE (5/15): EF 75%, hyperdynamic left ventricle   - Cardio-OB consult Monday  - s/p BMZ (-5/15) for FLM    #Maternal tachycardia  - Tachycardia 110s-120s, pt asymptomatic  - F/u TSH with next set of HELLP labs   - Continuos tele/pulse ox   - Bedside sono neg for B lines    #Fetal well being  - NST BID   - s/p BMZ (-5/15) for FLM   - Appreciate NICU consult   - BPP 2x/week  - ATU Sono (): Vtx, post placenta, AMAURY 10, PARSON 8/8 wt 1509g (26%)  - f/u GBS ()    #Maternal well being  - Reg diet  - HSQ for DVT ppx  - PNV, folic acid, ASA     #MOD  - Prior C/S x2  - MOD: Repeat C/S at 34 weeks or sooner PRN maternal or fetal concerns  - BCM: desires Depo

## 2021-05-16 NOTE — PROGRESS NOTE ADULT - SUBJECTIVE AND OBJECTIVE BOX
R3 Antepartum Note, HD#3    Interval events: Patient transferred back to L&D for continuos pulse ox     Patient seen and examined at bedside, no acute overnight events. No acute complaints. Pt reports +FM, denies LOF, VB, CTX, HA, epigastric pain, blurred vision, CP, SOB, N/V, fevers, and chills.    Vital Signs Last 24 Hrs  T(C): 36.9 (15 May 2021 22:28), Max: 36.9 (15 May 2021 12:34)  T(F): 98.42 (15 May 2021 22:28), Max: 98.42 (15 May 2021 12:34)  HR: 101 (16 May 2021 02:19) (98 - 135)  BP: 128/71 (15 May 2021 22:28) (128/71 - 166/76)  BP(mean): --  RR: 18 (15 May 2021 22:28) (16 - 18)  SpO2: 97% (16 May 2021 02:19) (83% - 100%)                          10.2   10.01 )-----------( 182      ( 15 May 2021 08:45 )             31.7       05-15    133<L>  |  102  |  10  ----------------------------<  102<H>  4.2   |  16<L>  |  0.42<L>    Ca    7.9<L>      15 May 2021 08:45  Mg     5.6     05-15    TPro  6.7  /  Alb  3.7  /  TBili  0.1<L>  /  DBili  x   /  AST  26  /  ALT  26  /  AlkPhos  79  05-15      CAPILLARY BLOOD GLUCOSE  POCT Blood Glucose.: 102 mg/dL (15 May 2021 12:34)  POCT Blood Glucose.: 114 mg/dL (15 May 2021 06:48)      LIVER FUNCTIONS - ( 15 May 2021 08:45 )  Alb: 3.7 g/dL / Pro: 6.7 g/dL / ALK PHOS: 79 U/L / ALT: 26 U/L / AST: 26 U/L / GGT: x             PT/INR - ( 15 May 2021 08:45 )   PT: 11.1 sec;   INR: 0.92 ratio    PTT - ( 15 May 2021 08:45 )  PTT:26.7 sec    Urinalysis Basic - ( 14 May 2021 11:31 )    Color: Colorless / Appearance: Clear / S.007 / pH: x  Gluc: x / Ketone: Negative  / Bili: Negative / Urobili: Negative   Blood: x / Protein: 100 / Nitrite: Negative   Leuk Esterase: Negative / RBC: x / WBC x   Sq Epi: x / Non Sq Epi: x / Bacteria: x      Physical Exam:  General: NAD, AOx3   Abdomen: Soft, non-tender, gravid  Ext: No pain or swelling    NST reactive overnight    MEDICATIONS  (STANDING):  acetaminophen   Tablet .. 975 milliGRAM(s) Oral once  aspirin enteric coated 81 milliGRAM(s) Oral daily  betamethasone Injectable 12 milliGRAM(s) IntraMuscular daily  diphenhydrAMINE   Injectable 25 milliGRAM(s) IV Push once  folic acid 1 milliGRAM(s) Oral daily  magnesium sulfate Infusion 2 Gm/Hr (50 mL/Hr) IV Continuous <Continuous>  NIFEdipine XL 30 milliGRAM(s) Oral daily  prenatal multivitamin 1 Tablet(s) Oral daily    MEDICATIONS  (PRN):   R3 Antepartum Note, HD#3    Interval events: Patient transferred back to L&D for continuos pulse ox     Patient seen and examined at bedside, no acute overnight events. Patient reports indigestion after dinner, currently improved. She reports +FM, denies LOF, VB, CTX, HA, epigastric pain, blurred vision, CP, SOB, lightheadedness, dizziness, palpitations, N/V, fevers or chills.    Vital Signs Last 24 Hrs  T(C): 36.9 (15 May 2021 22:28), Max: 36.9 (15 May 2021 12:34)  T(F): 98.42 (15 May 2021 22:28), Max: 98.42 (15 May 2021 12:34)  HR: 101 (16 May 2021 02:19) (98 - 135)  BP: 128/71 (15 May 2021 22:28) (128/71 - 166/76)  BP(mean): --  RR: 18 (15 May 2021 22:28) (16 - 18)  SpO2: 97% (16 May 2021 02:19) (83% - 100%)                          10.2   10.01 )-----------( 182      ( 15 May 2021 08:45 )             31.7       05-15    133<L>  |  102  |  10  ----------------------------<  102<H>  4.2   |  16<L>  |  0.42<L>    Ca    7.9<L>      15 May 2021 08:45  Mg     5.6     05-15    TPro  6.7  /  Alb  3.7  /  TBili  0.1<L>  /  DBili  x   /  AST  26  /  ALT  26  /  AlkPhos  79  05-15      CAPILLARY BLOOD GLUCOSE  POCT Blood Glucose.: 102 mg/dL (15 May 2021 12:34)  POCT Blood Glucose.: 114 mg/dL (15 May 2021 06:48)      LIVER FUNCTIONS - ( 15 May 2021 08:45 )  Alb: 3.7 g/dL / Pro: 6.7 g/dL / ALK PHOS: 79 U/L / ALT: 26 U/L / AST: 26 U/L / GGT: x             PT/INR - ( 15 May 2021 08:45 )   PT: 11.1 sec;   INR: 0.92 ratio    PTT - ( 15 May 2021 08:45 )  PTT:26.7 sec    Urinalysis Basic - ( 14 May 2021 11:31 )    Color: Colorless / Appearance: Clear / S.007 / pH: x  Gluc: x / Ketone: Negative  / Bili: Negative / Urobili: Negative   Blood: x / Protein: 100 / Nitrite: Negative   Leuk Esterase: Negative / RBC: x / WBC x   Sq Epi: x / Non Sq Epi: x / Bacteria: x      Physical Exam:  General: NAD, AOx3   Abdomen: Soft, non-tender, gravid  Ext: No pain or swelling    NST reactive overnight    MEDICATIONS  (STANDING):  acetaminophen   Tablet .. 975 milliGRAM(s) Oral once  aspirin enteric coated 81 milliGRAM(s) Oral daily  betamethasone Injectable 12 milliGRAM(s) IntraMuscular daily  diphenhydrAMINE   Injectable 25 milliGRAM(s) IV Push once  folic acid 1 milliGRAM(s) Oral daily  magnesium sulfate Infusion 2 Gm/Hr (50 mL/Hr) IV Continuous <Continuous>  NIFEdipine XL 30 milliGRAM(s) Oral daily  prenatal multivitamin 1 Tablet(s) Oral daily    MEDICATIONS  (PRN):   Followed protocol

## 2021-05-16 NOTE — PROGRESS NOTE ADULT - ATTENDING COMMENTS
34 yo  at 30 wks 4 day gestation admitted with concern for chronic hypertension with superimposed preeclampsia with severe features due to severe range BPs and worsening proteinuria  Labs from admission with increase in LFTs (44/28) with AST slightly elevated Hb 10.0, UA: 5.3 and P/C 3.2 PC (0.8 last week). Repeat PET labs WNL.  Patient denies any headaches, vision changes, epigastric or RUQ pain, no N/V. Episode of palpitations while talking to her family last night, but currently no palpitations, chest pain, dizziness, SOB.  Pulse 100-120s, with HR at rest 100-110s, occasionally 120s with moving/talking. Pulse Ox >95% at rest, sporadic values below this but immediately followed by normal readings within 1 minute therefore questioning accuracy of measurements.   S/p MgSO4  On procardia 60 XL daily.   Bps: 659-617l-48-80s.  BTM -05/15  S/p NICU consult  US  with appropriate growth EFW 26th percentile, 1509g AMAURY: 10, cephalic, BPP 8/8  EKG: Sinus tachycardia  Echo: Hyperdynamic left ventricular function.  Lung sono: no B lines  Physical exam: Chest: CTAB, lower extremities: no edema, homas neg.   Plan:   Cardioobstretics to evaluate patient tomorrow am.   Continue assessments of maternal symptoms while inpatient. If SOB, tachypnea, persistent tachycardia >120s or Pulse ox <95, consider further workup with CTA for PE.   Repeat Labs tomorrow with iron studies and TSH. If stable repeat Q 3 days until delivery. Pending results 24h urine protein. Proteinuria at this time likely from preeclampsia.  Continue procardia 60 XL with close monitoring of Bps.   BPP Twice weekly while in patient M/F. NST BID  Continue in patient management until delivery. Delivery at 34 weeks, sooner if clinically indicated by RLTCS  Contraception: Postpartum depo.     Dr Hosea Bowie I was present and participated in all key portions of assessment and plan.   Agree with resident's note. 34 yo  at 30 wks 4 day gestation admitted with concern for chronic hypertension with superimposed preeclampsia with severe features due to severe range BPs and worsening proteinuria  Labs from admission with increase in LFTs (44/28) with AST slightly elevated Hb 10.0, UA: 5.3 and P/C 3.2 PC (0.8 last week). Repeat PET labs WNL.  Patient denies any headaches, vision changes, epigastric or RUQ pain, no N/V. Episode of palpitations while talking to her family last night, but currently no palpitations, chest pain, dizziness, SOB.  Pulse 100-120s, with HR at rest 100-110s, occasionally 120s with moving/talking. Pulse Ox >95% at rest, sporadic values below this but immediately followed by normal readings within 1 minute therefore questioning accuracy of measurements.   S/p MgSO4  On procardia 60 XL daily.   Bps: 455-564w-96-80s.  BTM -05/15  S/p NICU consult  US  with appropriate growth EFW 26th percentile, 1509g AMAURY: 10, cephalic, BPP 8/8  EKG: Sinus tachycardia  Echo: Hyperdynamic left ventricular function.  Lung sono: no B lines  Physical exam: Chest: CTAB, lower extremities: no edema, homas neg.   Plan:   Cardioobstretics to evaluate patient tomorrow am.   Continue assessments of maternal symptoms while inpatient. If SOB, tachypnea, persistent tachycardia >120s or Pulse ox <95, consider further workup with CTA for PE.   Repeat Labs tomorrow with iron studies and TSH. If stable repeat Q 3 days until delivery. Pending results 24h urine protein. Proteinuria at this time likely from preeclampsia.  Continue procardia 60 XL with close monitoring of Bps.   BPP Twice weekly while in patient M/F. NST BID  Continue in patient management until delivery. Delivery at 34 weeks, sooner if clinically indicated by RLTCS  Contraception: Postpartum depo.   PPx: SCD, ambulation, heparin    Dr Hosea Bowie I was present and participated in all key portions of assessment and plan.   Agree with resident's note.

## 2021-05-17 DIAGNOSIS — Z23 ENCOUNTER FOR IMMUNIZATION: ICD-10-CM

## 2021-05-17 DIAGNOSIS — O09.629 SUPERVISION OF YOUNG MULTIGRAVIDA, UNSPECIFIED TRIMESTER: ICD-10-CM

## 2021-05-17 DIAGNOSIS — I10 ESSENTIAL (PRIMARY) HYPERTENSION: ICD-10-CM

## 2021-05-17 LAB
ALBUMIN SERPL ELPH-MCNC: 3.5 G/DL — SIGNIFICANT CHANGE UP (ref 3.3–5)
ALP SERPL-CCNC: 72 U/L — SIGNIFICANT CHANGE UP (ref 40–120)
ALT FLD-CCNC: 21 U/L — SIGNIFICANT CHANGE UP (ref 10–45)
ANION GAP SERPL CALC-SCNC: 14 MMOL/L — SIGNIFICANT CHANGE UP (ref 5–17)
APTT BLD: 24.8 SEC — LOW (ref 27.5–35.5)
AST SERPL-CCNC: 23 U/L — SIGNIFICANT CHANGE UP (ref 10–40)
BASOPHILS # BLD AUTO: 0 K/UL — SIGNIFICANT CHANGE UP (ref 0–0.2)
BASOPHILS NFR BLD AUTO: 0 % — SIGNIFICANT CHANGE UP (ref 0–2)
BILIRUB SERPL-MCNC: 0.1 MG/DL — LOW (ref 0.2–1.2)
BUN SERPL-MCNC: 21 MG/DL — SIGNIFICANT CHANGE UP (ref 7–23)
CALCIUM SERPL-MCNC: 9.6 MG/DL — SIGNIFICANT CHANGE UP (ref 8.4–10.5)
CHLORIDE SERPL-SCNC: 104 MMOL/L — SIGNIFICANT CHANGE UP (ref 96–108)
CO2 SERPL-SCNC: 19 MMOL/L — LOW (ref 22–31)
CREAT SERPL-MCNC: 0.6 MG/DL — SIGNIFICANT CHANGE UP (ref 0.5–1.3)
EOSINOPHIL # BLD AUTO: 0 K/UL — SIGNIFICANT CHANGE UP (ref 0–0.5)
EOSINOPHIL NFR BLD AUTO: 0 % — SIGNIFICANT CHANGE UP (ref 0–6)
FERRITIN SERPL-MCNC: 40 NG/ML — SIGNIFICANT CHANGE UP (ref 15–150)
FIBRINOGEN PPP-MCNC: 548 MG/DL — HIGH (ref 290–520)
GLUCOSE SERPL-MCNC: 77 MG/DL — SIGNIFICANT CHANGE UP (ref 70–99)
GROUP B BETA STREP DNA (PCR): SIGNIFICANT CHANGE UP
GROUP B BETA STREP INTERPRETATION: SIGNIFICANT CHANGE UP
HCT VFR BLD CALC: 31.1 % — LOW (ref 34.5–45)
HGB BLD-MCNC: 10 G/DL — LOW (ref 11.5–15.5)
INR BLD: 0.91 RATIO — SIGNIFICANT CHANGE UP (ref 0.88–1.16)
LDH SERPL L TO P-CCNC: 163 U/L — SIGNIFICANT CHANGE UP (ref 50–242)
LYMPHOCYTES # BLD AUTO: 1.36 K/UL — SIGNIFICANT CHANGE UP (ref 1–3.3)
LYMPHOCYTES # BLD AUTO: 12.7 % — LOW (ref 13–44)
MCHC RBC-ENTMCNC: 27.4 PG — SIGNIFICANT CHANGE UP (ref 27–34)
MCHC RBC-ENTMCNC: 32.2 GM/DL — SIGNIFICANT CHANGE UP (ref 32–36)
MCV RBC AUTO: 85.2 FL — SIGNIFICANT CHANGE UP (ref 80–100)
MONOCYTES # BLD AUTO: 0.4 K/UL — SIGNIFICANT CHANGE UP (ref 0–0.9)
MONOCYTES NFR BLD AUTO: 3.7 % — SIGNIFICANT CHANGE UP (ref 2–14)
NEUTROPHILS # BLD AUTO: 8.87 K/UL — HIGH (ref 1.8–7.4)
NEUTROPHILS NFR BLD AUTO: 82.7 % — HIGH (ref 43–77)
PLATELET # BLD AUTO: 189 K/UL — SIGNIFICANT CHANGE UP (ref 150–400)
POTASSIUM SERPL-MCNC: 4.2 MMOL/L — SIGNIFICANT CHANGE UP (ref 3.5–5.3)
POTASSIUM SERPL-SCNC: 4.2 MMOL/L — SIGNIFICANT CHANGE UP (ref 3.5–5.3)
PROT SERPL-MCNC: 6.4 G/DL — SIGNIFICANT CHANGE UP (ref 6–8.3)
PROTHROM AB SERPL-ACNC: 11 SEC — SIGNIFICANT CHANGE UP (ref 10.6–13.6)
RBC # BLD: 3.65 M/UL — LOW (ref 3.8–5.2)
RBC # FLD: 14.2 % — SIGNIFICANT CHANGE UP (ref 10.3–14.5)
SODIUM SERPL-SCNC: 137 MMOL/L — SIGNIFICANT CHANGE UP (ref 135–145)
SOURCE GROUP B STREP: SIGNIFICANT CHANGE UP
TSH SERPL-MCNC: 5.54 UIU/ML — HIGH (ref 0.27–4.2)
URATE SERPL-MCNC: 5.4 MG/DL — SIGNIFICANT CHANGE UP (ref 2.5–7)
WBC # BLD: 10.72 K/UL — HIGH (ref 3.8–10.5)
WBC # FLD AUTO: 10.72 K/UL — HIGH (ref 3.8–10.5)

## 2021-05-17 PROCEDURE — 99232 SBSQ HOSP IP/OBS MODERATE 35: CPT

## 2021-05-17 RX ORDER — HEPARIN SODIUM 5000 [USP'U]/ML
5000 INJECTION INTRAVENOUS; SUBCUTANEOUS EVERY 8 HOURS
Refills: 0 | Status: DISCONTINUED | OUTPATIENT
Start: 2021-05-17 | End: 2021-05-21

## 2021-05-17 RX ADMIN — Medication 1 TABLET(S): at 12:18

## 2021-05-17 RX ADMIN — HEPARIN SODIUM 5000 UNIT(S): 5000 INJECTION INTRAVENOUS; SUBCUTANEOUS at 10:42

## 2021-05-17 RX ADMIN — Medication 60 MILLIGRAM(S): at 17:49

## 2021-05-17 RX ADMIN — Medication 1 MILLIGRAM(S): at 12:18

## 2021-05-17 RX ADMIN — Medication 81 MILLIGRAM(S): at 12:43

## 2021-05-17 RX ADMIN — HEPARIN SODIUM 5000 UNIT(S): 5000 INJECTION INTRAVENOUS; SUBCUTANEOUS at 19:42

## 2021-05-17 NOTE — PROGRESS NOTE ADULT - ASSESSMENT
A/P: Pt is a 37yo  at 30w6d admitted with siPEC by severe range BPs and worsening proteinuria. Patient previously tachy to 110s-120s. Tachycardia improved. BP mildly elevated yesterday, procardia increased to 60. Patient denies symptoms of preeclampsia.     #siPEC  - s/p Mag x 24 hours for seizure ppx  - s/p Lab 20 IVP (5/15)   - c/w Procardia XL 60 QD  - TTE (5/15): EF 75%, hyperdynamic left ventricle   - Cardio-OB consult Today  - s/p BMZ (-5/15) for FLM    #Maternal tachycardia  - Tachycardia improved  - f/u TSH  - Continuos tele/pulse ox   - Bedside sono neg for B lines    #Fetal well being  - NST BID   - s/p BMZ (-5/15) for FLM   - Appreciate NICU consult   - BPP 2x/week  - ATU Sono (): Vtx, post placenta, AMAURY 10, PARSON 8/8 wt 1509g (26%)  - f/u GBS ()    #Maternal well being  - Reg diet  - HSQ for DVT ppx  - PNV, folic acid, ASA     #MOD  - Prior C/S x2  - MOD: Repeat C/S at 34 weeks or sooner PRN maternal or fetal concerns  - BCM: desires Depo    Msantandreu PGY3

## 2021-05-17 NOTE — PROGRESS NOTE ADULT - SUBJECTIVE AND OBJECTIVE BOX
R3 Progress note: HD# 4    Patient seen and examined at bedside, no acute overnight events. No acute complaints.     Pt reports +FM. Denies LOF, VB, ctx, HA, epigastric pain, blurred vision, CP, SOB, N/V, fevers, and chills.    Vital Signs Last 24 Hours  T(C): 36.7 (05-17-21 @ 06:41), Max: 37.2 (05-16-21 @ 10:59)  HR: 109 (05-17-21 @ 06:41) (93 - 126)  BP: 132/76 (05-17-21 @ 06:41) (122/76 - 144/81)  RR: 18 (05-17-21 @ 06:41) (18 - 20)  SpO2: 98% (05-17-21 @ 06:41) (85% - 100%)    CAPILLARY BLOOD GLUCOSE      Physical Exam:  General: NAD  Abdomen: Soft, non-tender, gravid  Ext: No pain or swelling    Labs:             10.2   10.01 )-----------( 182      ( 05-15 @ 08:45 )             31.7     05-15 @ 08:45    133  |  102  |  10  ----------------------------<  102  4.2   |  16  |  0.42    Ca    7.9      05-15 @ 08:45  Mg     5.6     05-15 @ 08:45    TPro  6.7  /  Alb  3.7  /  TBili  0.1  /  DBili  x   /  AST  26  /  ALT  26  /  AlkPhos  79  05-15 @ 08:45    PT/INR - ( 05-15 @ 08:45 )   PT: 11.1 sec;   INR: 0.92 ratio    PTT - ( 05-15 @ 08:45 )  PTT:26.7 sec    Uric Acid: (05-15 @ 08:45)  5.3      Fibrinogen: (05-15 @ 08:45)  812      LDH: (05-15 @ 08:45)  146        MEDICATIONS  (STANDING):  acetaminophen   Tablet .. 975 milliGRAM(s) Oral every 6 hours  aspirin enteric coated 81 milliGRAM(s) Oral daily  folic acid 1 milliGRAM(s) Oral daily  heparin   Injectable 5000 Unit(s) SubCutaneous every 12 hours  NIFEdipine XL 60 milliGRAM(s) Oral daily  prenatal multivitamin 1 Tablet(s) Oral daily    MEDICATIONS  (PRN):  famotidine    Tablet 20 milliGRAM(s) Oral daily PRN reflux, indigestion

## 2021-05-18 LAB
ALBUMIN SERPL ELPH-MCNC: 3.6 G/DL — SIGNIFICANT CHANGE UP (ref 3.3–5)
ALP SERPL-CCNC: 78 U/L — SIGNIFICANT CHANGE UP (ref 40–120)
ALT FLD-CCNC: 20 U/L — SIGNIFICANT CHANGE UP (ref 10–45)
ANION GAP SERPL CALC-SCNC: 15 MMOL/L — SIGNIFICANT CHANGE UP (ref 5–17)
APTT BLD: 27.6 SEC — SIGNIFICANT CHANGE UP (ref 27.5–35.5)
AST SERPL-CCNC: 22 U/L — SIGNIFICANT CHANGE UP (ref 10–40)
BASOPHILS # BLD AUTO: 0.03 K/UL — SIGNIFICANT CHANGE UP (ref 0–0.2)
BASOPHILS NFR BLD AUTO: 0.4 % — SIGNIFICANT CHANGE UP (ref 0–2)
BILIRUB SERPL-MCNC: 0.2 MG/DL — SIGNIFICANT CHANGE UP (ref 0.2–1.2)
BUN SERPL-MCNC: 19 MG/DL — SIGNIFICANT CHANGE UP (ref 7–23)
CALCIUM SERPL-MCNC: 9.4 MG/DL — SIGNIFICANT CHANGE UP (ref 8.4–10.5)
CHLORIDE SERPL-SCNC: 102 MMOL/L — SIGNIFICANT CHANGE UP (ref 96–108)
CO2 SERPL-SCNC: 18 MMOL/L — LOW (ref 22–31)
CREAT SERPL-MCNC: 0.53 MG/DL — SIGNIFICANT CHANGE UP (ref 0.5–1.3)
EOSINOPHIL # BLD AUTO: 0.01 K/UL — SIGNIFICANT CHANGE UP (ref 0–0.5)
EOSINOPHIL NFR BLD AUTO: 0.1 % — SIGNIFICANT CHANGE UP (ref 0–6)
FIBRINOGEN PPP-MCNC: 523 MG/DL — HIGH (ref 290–520)
GLUCOSE SERPL-MCNC: 87 MG/DL — SIGNIFICANT CHANGE UP (ref 70–99)
HCT VFR BLD CALC: 32.8 % — LOW (ref 34.5–45)
HGB BLD-MCNC: 10.8 G/DL — LOW (ref 11.5–15.5)
IMM GRANULOCYTES NFR BLD AUTO: 7.2 % — HIGH (ref 0–1.5)
INR BLD: 0.91 RATIO — SIGNIFICANT CHANGE UP (ref 0.88–1.16)
IRON SATN MFR SERPL: 21 % — SIGNIFICANT CHANGE UP (ref 14–50)
IRON SATN MFR SERPL: 86 UG/DL — SIGNIFICANT CHANGE UP (ref 30–160)
LDH SERPL L TO P-CCNC: 186 U/L — SIGNIFICANT CHANGE UP (ref 50–242)
LYMPHOCYTES # BLD AUTO: 2.37 K/UL — SIGNIFICANT CHANGE UP (ref 1–3.3)
LYMPHOCYTES # BLD AUTO: 27.8 % — SIGNIFICANT CHANGE UP (ref 13–44)
MCHC RBC-ENTMCNC: 27.8 PG — SIGNIFICANT CHANGE UP (ref 27–34)
MCHC RBC-ENTMCNC: 32.9 GM/DL — SIGNIFICANT CHANGE UP (ref 32–36)
MCV RBC AUTO: 84.3 FL — SIGNIFICANT CHANGE UP (ref 80–100)
MONOCYTES # BLD AUTO: 0.72 K/UL — SIGNIFICANT CHANGE UP (ref 0–0.9)
MONOCYTES NFR BLD AUTO: 8.4 % — SIGNIFICANT CHANGE UP (ref 2–14)
NEUTROPHILS # BLD AUTO: 4.79 K/UL — SIGNIFICANT CHANGE UP (ref 1.8–7.4)
NEUTROPHILS NFR BLD AUTO: 56.1 % — SIGNIFICANT CHANGE UP (ref 43–77)
NRBC # BLD: 0 /100 WBCS — SIGNIFICANT CHANGE UP (ref 0–0)
PLATELET # BLD AUTO: 187 K/UL — SIGNIFICANT CHANGE UP (ref 150–400)
POTASSIUM SERPL-MCNC: 4.1 MMOL/L — SIGNIFICANT CHANGE UP (ref 3.5–5.3)
POTASSIUM SERPL-SCNC: 4.1 MMOL/L — SIGNIFICANT CHANGE UP (ref 3.5–5.3)
PROT SERPL-MCNC: 6.5 G/DL — SIGNIFICANT CHANGE UP (ref 6–8.3)
PROTHROM AB SERPL-ACNC: 11 SEC — SIGNIFICANT CHANGE UP (ref 10.6–13.6)
RBC # BLD: 3.89 M/UL — SIGNIFICANT CHANGE UP (ref 3.8–5.2)
RBC # FLD: 13.9 % — SIGNIFICANT CHANGE UP (ref 10.3–14.5)
SODIUM SERPL-SCNC: 135 MMOL/L — SIGNIFICANT CHANGE UP (ref 135–145)
T4 FREE SERPL-MCNC: 0.9 NG/DL — SIGNIFICANT CHANGE UP (ref 0.9–1.8)
TIBC SERPL-MCNC: 420 UG/DL — SIGNIFICANT CHANGE UP (ref 220–430)
UIBC SERPL-MCNC: 334 UG/DL — SIGNIFICANT CHANGE UP (ref 110–370)
URATE SERPL-MCNC: 6 MG/DL — SIGNIFICANT CHANGE UP (ref 2.5–7)
WBC # BLD: 8.53 K/UL — SIGNIFICANT CHANGE UP (ref 3.8–10.5)
WBC # FLD AUTO: 8.53 K/UL — SIGNIFICANT CHANGE UP (ref 3.8–10.5)

## 2021-05-18 PROCEDURE — 99231 SBSQ HOSP IP/OBS SF/LOW 25: CPT

## 2021-05-18 PROCEDURE — 99222 1ST HOSP IP/OBS MODERATE 55: CPT | Mod: GC

## 2021-05-18 RX ORDER — FAMOTIDINE 10 MG/ML
20 INJECTION INTRAVENOUS DAILY
Refills: 0 | Status: DISCONTINUED | OUTPATIENT
Start: 2021-05-18 | End: 2021-05-25

## 2021-05-18 RX ADMIN — HEPARIN SODIUM 5000 UNIT(S): 5000 INJECTION INTRAVENOUS; SUBCUTANEOUS at 13:48

## 2021-05-18 RX ADMIN — Medication 81 MILLIGRAM(S): at 11:57

## 2021-05-18 RX ADMIN — Medication 60 MILLIGRAM(S): at 18:18

## 2021-05-18 RX ADMIN — Medication 1 TABLET(S): at 11:55

## 2021-05-18 RX ADMIN — Medication 1 MILLIGRAM(S): at 11:57

## 2021-05-18 RX ADMIN — FAMOTIDINE 20 MILLIGRAM(S): 10 INJECTION INTRAVENOUS at 11:55

## 2021-05-18 RX ADMIN — HEPARIN SODIUM 5000 UNIT(S): 5000 INJECTION INTRAVENOUS; SUBCUTANEOUS at 05:28

## 2021-05-18 NOTE — PROGRESS NOTE ADULT - ASSESSMENT
A/P: Pt is a 37yo  at 31w admitted with siPEC by severe range BPs and worsening proteinuria. Patient previously tachy to 110s-120s. Tachycardia improved. BP mildly elevated yesterday, procardia increased to 60. Patient denies symptoms of preeclampsia.     #siPEC  - s/p Mag x 24 hours for seizure ppx  - s/p Lab 20 IVP (5/15)   - c/w Procardia XL 60 QD  - TTE (5/15): EF 75%, hyperdynamic left ventricle   - Cardio-OB consult Today  - s/p BMZ (-5/15) for FLM    #Maternal tachycardia  - Tachycardia improved  - TSH: 5.54  - Continuos tele/pulse ox   - Bedside sono neg for B lines    #Fetal well being  - NST BID   - s/p BMZ (-5/15) for FLM   - Appreciate NICU consult   - BPP 2x/week  - ATU Sono (): Vtx, post placenta, AMAURY 10, PARSON 8/ wt 1509g (26%)  - f/u GBS ()    #Maternal well being  - Reg diet  - HSQ for DVT ppx  - PNV, folic acid, ASA   - Pepcid QD    #MOD  - Prior C/S x2  - MOD: Repeat C/S at 34 weeks or sooner PRN maternal or fetal concerns  - BCM: desires Depo, declines tubal    Msantandreu PGY3   A/P: Pt is a 35yo  at 31w admitted with siPEC by severe range BPs and worsening proteinuria. Patient previously tachy to 110s-120s. Tachycardia improved. BP mildly elevated yesterday, procardia increased to 60. Patient denies symptoms of preeclampsia.     #siPEC  - s/p Mag x 24 hours for seizure ppx  - s/p Lab 20 IVP (5/15)   - c/w Procardia XL 60 QD  - TTE (5/15): EF 75%, hyperdynamic left ventricle   - Cardio-OB consult Today  - s/p BMZ (-5/15) for FLM    #Maternal tachycardia  - Tachycardia improved  - TSH: 5.54  - Continuos tele/pulse ox   - Bedside sono neg for B lines    #Fetal well being  - NST BID   - s/p BMZ (-5/15) for FLM   - Appreciate NICU consult   - BPP 2x/week  - ATU Sono (): Vtx, post placenta, AMAURY 10, PARSON 8/8 wt 1509g (26%)  - f/u GBS ()    #Maternal well being  - Reg diet  - HSQ for DVT ppx  - PNV, folic acid, ASA   - Pepcid QD    #MOD  - Prior C/S x2  - MOD: Repeat C/S at 34 weeks or sooner PRN maternal or fetal concerns  - BCM: desires Depo, declines tubal    Msantandreu PGY3    MFM Fellow Addendum  35 yo  at 31 wks admitted with chronic hypertension with superimposed preeclampsia with severe features. Improved Bp control with Procardia 60mg XL. Tachycardia improving. TSH >5, FT4 0.9. Patient remains asymptomatic. FHR reactive with reassuring fetal status. s/p BMZ x2 for fetal lung maturity. Nephrotic range proteinuria, continue Heparin 5,000 TID for DVT prophylaxis. Continue inpatient management. Seen and counseled with Dr. Beltran.    Vee Valle MD  Fellow, Maternal Fetal Medicine

## 2021-05-18 NOTE — CONSULT NOTE ADULT - ASSESSMENT
36 year old female,  at 31 weeks admitted with preeclampsia by severe ranging BPs and worsening proteinuria. Patient previously tachy to 110s-120s. Tachycardia improved. BP mildly elevated yesterday, procardia increased to 60. Patient denies symptoms of preeclampsia.     #siPEC  - s/p Mag x 24 hours for seizure ppx  - s/p Lab 20 IVP (5/15)   - c/w Procardia XL 60 QD  - TTE (5/15): EF 75%, hyperdynamic left ventricle   - Cardio-OB consult Today  - s/p BMZ (-5/15) for FLM    #Maternal tachycardia  - Tachycardia improved  - TSH: 5.54  - Continuos tele/pulse ox   - Bedside sono neg for B lines    #Fetal well being  - NST BID   - s/p BMZ (-5/15) for FLM   - Appreciate NICU consult   - BPP 2x/week  - ATU Sono (): Vtx, post placenta, AMAURY 10, PARSON 8/8 wt 1509g (26%)  - f/u GBS ()    #Maternal well being  - Reg diet  - HSQ for DVT ppx  - PNV, folic acid, ASA   - Pepcid QD    #MOD  - Prior C/S x2  - MOD: Repeat C/S at 34 weeks or sooner PRN matern 36 year old female,  at 31 weeks admitted with preeclampsia by severe ranging BPs and worsening proteinuria. Patient previously tachy to 110s-120s. Tachycardia improved. BP mildly elevated yesterday, procardia increased to 60. Patient denies symptoms of preeclampsia.     Hypertension  Blood pressure within range this AM  Continue on Procardia XL 60 mg daily  If BP rises above 150/90, can consider increasing Procardia to   30 mg QAM and 60 mg QPM  or starting Labetalol to address hypertension and tachcyardia    Maternal tachycardia  - Tachycardia improved  - Continue tele/pulse ox     MF considering to deliver at 34 weeks unless preeclamptic symptoms worsen       36 year old female,  at 31 weeks admitted with preeclampsia by severe ranging BPs and worsening proteinuria. Patient previously tachy to 110s-120s. Tachycardia improved. BP mildly elevated yesterday, procardia increased to 60. Patient denies symptoms of preeclampsia.     Hypertension  Blood pressure within range this AM  Continue on Procardia XL 60 mg daily  If BP rises above 150/90, can consider increasing Procardia to   30 mg QAM and 60 mg QPM  or starting Labetalol to address hypertension and tachcyardia    Maternal tachycardia  - Tachycardia improved  - Continue tele/pulse ox     MFM considering to deliver at 34 weeks unless preeclamptic symptoms worsen  Cardio OB following

## 2021-05-18 NOTE — PROGRESS NOTE ADULT - SUBJECTIVE AND OBJECTIVE BOX
R3 Progress note: HD# 5    Patient seen and examined at bedside, no acute overnight events. Still has some mild epigastric pain.     Pt reports +FM. Denies LOF, VB, ctx, HA, blurred vision, CP, SOB, N/V, fevers, and chills.    Vital Signs Last 24 Hours  T(C): 36.9 (05-18-21 @ 05:48), Max: 37 (05-17-21 @ 21:26)  HR: 102 (05-18-21 @ 05:48) (84 - 110)  BP: 135/84 (05-18-21 @ 05:48) (112/61 - 138/84)  RR: 18 (05-18-21 @ 05:48) (18 - 18)  SpO2: 98% (05-18-21 @ 05:48) (98% - 100%)    CAPILLARY BLOOD GLUCOSE        Physical Exam:  General: NAD  Abdomen: Soft, non-tender, gravid  Ext: No pain or swelling    Labs:             10.0   10.72 )-----------( 189      ( 05-17 @ 10:57 )             31.1     05-17 @ 10:57    137  |  104  |  21  ----------------------------<  77  4.2   |  19  |  0.60    Ca    9.6      05-17 @ 10:57    TPro  6.4  /  Alb  3.5  /  TBili  0.1  /  DBili  x   /  AST  23  /  ALT  21  /  AlkPhos  72  05-17 @ 10:57    PT/INR - ( 05-17 @ 10:57 )   PT: 11.0 sec;   INR: 0.91 ratio    PTT - ( 05-17 @ 10:57 )  PTT:24.8 sec    Uric Acid: (05-17 @ 10:57)  5.4      Fibrinogen: (05-17 @ 10:57)  548      LDH: (05-17 @ 10:57)  163        MEDICATIONS  (STANDING):  acetaminophen   Tablet .. 975 milliGRAM(s) Oral every 6 hours  aspirin enteric coated 81 milliGRAM(s) Oral daily  famotidine    Tablet 20 milliGRAM(s) Oral daily  folic acid 1 milliGRAM(s) Oral daily  heparin   Injectable 5000 Unit(s) SubCutaneous every 8 hours  NIFEdipine XL 60 milliGRAM(s) Oral daily  prenatal multivitamin 1 Tablet(s) Oral daily    MEDICATIONS  (PRN):  famotidine    Tablet 20 milliGRAM(s) Oral daily PRN reflux, indigestion

## 2021-05-18 NOTE — CONSULT NOTE ADULT - SUBJECTIVE AND OBJECTIVE BOX
HISTORY OF PRESENT ILLNESS:    HPI:  The  is a 35 year old female  @ 30 weeks and 3 days. She carries a personal history of chronic hypertension.  was admitted to [facility] for severe ranging BP.  BPs were 160s-170/90s. She said that over the past 2 weeks her BP have been higher than usual. She was taking Lisinopril outside of pregnancy, but has not been taking BP meds during the pregnancy. She usually runs in the 120s-30s/60s-80s. Over the past 2 weeks her BP have been 130s-140s/80s-90s.       2010: pLTCS @29wks sPEC -  demise @ 1 DOL  2011: rLTCS @34wks (PPROM @ 32wks)  GYN: Denies  PMH: Chronic hypertension  PSH:   sections X 2        MEDICATIONS  (STANDING):  acetaminophen   Tablet .. 975 milliGRAM(s) Oral every 6 hours  aspirin enteric coated 81 milliGRAM(s) Oral daily  famotidine    Tablet 20 milliGRAM(s) Oral daily  folic acid 1 milliGRAM(s) Oral daily  heparin   Injectable 5000 Unit(s) SubCutaneous every 8 hours  NIFEdipine XL 60 milliGRAM(s) Oral daily  prenatal multivitamin 1 Tablet(s) Oral daily    MEDICATIONS  (PRN):  famotidine    Tablet 20 milliGRAM(s) Oral daily PRN reflux, indigestion      PHYSICAL EXAM:    T(C): 36.9 (21 @ 05:48), Max: 37.2 (21 @ 10:59)  HR: 102 (21 @ 05:48) (84 - 135)  BP: 135/84 (21 @ 05:48) (112/61 - 166/76)  RR: 18 (21 @ 05:48) (16 - 20)  SpO2: 98% (21 @ 05:48) (83% - 100%)  I&O's Summary      Appearance: Normal		  Cardiovascular: Normal S1 S2, No JVD, No murmurs, No edema  Respiratory: Lungs clear to auscultation	  Psychiatry: A & O x 3, Mood & affect appropriate  Gastrointestinal:  Soft, Non-tender, + BS		  Neurologic: Non-focal  Extremities: Normal range of motion, No clubbing, cyanosis or edema  Vascular: Peripheral pulses palpable 2+ bilaterally    LABS                         10.0   10.72 )-----------( 189      ( 17 May 2021 10:57 )             31.1         137  |  104  |  21  ----------------------------<  77  4.2   |  19<L>  |  0.60    Ca    9.6      17 May 2021 10:57    TPro  6.4  /  Alb  3.5  /  TBili  0.1<L>  /  DBili  x   /  AST  23  /  ALT  21  /  AlkPhos  72        TSH: Thyroid Stimulating Hormone, Serum: 5.54 uIU/mL ( @ 15:15)   	  PT/INR - (  @ 10:57 )   PT: 11.0 sec;   INR: 0.91 ratio    PTT - (  @ 10:57 )  PTT:24.8 sec    Uric Acid: ( @ 10:57)  5.4      Fibrinogen: ( @ 10:57)  548      LDH: ( @ 10:57)  163               HISTORY OF PRESENT ILLNESS:    HPI:  The  is a 35 year old female  @ 30 weeks and 3 days. She carries a personal history of chronic hypertension.  was admitted to [place] for severe ranging BP.  BPs were 160s-170/90s. She said that over the past 2 weeks her BP have been higher than usual. She was taking Lisinopril outside of pregnancy, but has not been taking BP meds during the pregnancy. She usually runs in the 120s-30s/60s-80s. Over the past 2 weeks her BP have been 130s-140s/80s-90s. Heart rate elevated, sinus tachycardia ranging [id number]'s .      2010: pLTCS @29wks sPEC -  demise @ 1 DOL  2011: rLTCS @34wks (PPROM @ 32wks)  GYN: Denies  PMH: Chronic hypertension  PSH:   sections X 2        MEDICATIONS  (STANDING):  acetaminophen   Tablet .. 975 milliGRAM(s) Oral every 6 hours  aspirin enteric coated 81 milliGRAM(s) Oral daily  famotidine    Tablet 20 milliGRAM(s) Oral daily  folic acid 1 milliGRAM(s) Oral daily  heparin   Injectable 5000 Unit(s) SubCutaneous every 8 hours  NIFEdipine XL 60 milliGRAM(s) Oral daily  prenatal multivitamin 1 Tablet(s) Oral daily    MEDICATIONS  (PRN):  famotidine    Tablet 20 milliGRAM(s) Oral daily PRN reflux, indigestion      PHYSICAL EXAM:    T(C): 36.9 (21 @ 05:48), Max: 37.2 (21 @ 10:59)  HR: 102 (21 @ 05:48) (84 - 135)  BP: 135/84 (21 @ 05:48) (112/61 - 166/76)  RR: 18 (21 @ 05:48) (16 - 20)  SpO2: 98% (21 @ 05:48) (83% - 100%)  I&O's Summary      Appearance: Normal		  Cardiovascular: Normal S1 S2, No JVD, No murmurs, No edema  Respiratory: Lungs clear to auscultation	  Psychiatry: A & O x 3, Mood & affect appropriate  Gastrointestinal:  Soft, Non-tender, + BS		  Neurologic: Non-focal  Extremities: Normal range of motion, No clubbing, cyanosis or edema  Vascular: Peripheral pulses palpable 2+ bilaterally    LABS                         10.0   10.72 )-----------( 189      ( 17 May 2021 10:57 )             31.1         137  |  104  |  21  ----------------------------<  77  4.2   |  19<L>  |  0.60    Ca    9.6      17 May 2021 10:57    TPro  6.4  /  Alb  3.5  /  TBili  0.1<L>  /  DBili  x   /  AST  23  /  ALT  21  /  AlkPhos  72        TSH: Thyroid Stimulating Hormone, Serum: 5.54 uIU/mL ( @ 15:15)   	  PT/INR - (  @ 10:57 )   PT: 11.0 sec;   INR: 0.91 ratio    PTT - (  @ 10:57 )  PTT:24.8 sec    Uric Acid: ( @ 10:57)  5.4      Fibrinogen: ( @ 10:57)  548      LDH: ( @ 10:57)  163      DIAGNOSTICS  Echocardiogram  May 15, 2021    LVEF 75%  Hyperdynamic LV systolic function  Normal right atrium  Normal Right ventricular size and function  Normal diastolic function  Minimal TR  Normal pulmonic valve  Normal pericardium with no pericardial effusion

## 2021-05-19 LAB
ALBUMIN SERPL ELPH-MCNC: 3.3 G/DL — SIGNIFICANT CHANGE UP (ref 3.3–5)
ALP SERPL-CCNC: 79 U/L — SIGNIFICANT CHANGE UP (ref 40–120)
ALT FLD-CCNC: 28 U/L — SIGNIFICANT CHANGE UP (ref 10–45)
ANION GAP SERPL CALC-SCNC: 15 MMOL/L — SIGNIFICANT CHANGE UP (ref 5–17)
APTT BLD: 31.2 SEC — SIGNIFICANT CHANGE UP (ref 27.5–35.5)
AST SERPL-CCNC: 28 U/L — SIGNIFICANT CHANGE UP (ref 10–40)
BASOPHILS # BLD AUTO: 0.03 K/UL — SIGNIFICANT CHANGE UP (ref 0–0.2)
BASOPHILS NFR BLD AUTO: 0.3 % — SIGNIFICANT CHANGE UP (ref 0–2)
BILIRUB SERPL-MCNC: 0.2 MG/DL — SIGNIFICANT CHANGE UP (ref 0.2–1.2)
BUN SERPL-MCNC: 14 MG/DL — SIGNIFICANT CHANGE UP (ref 7–23)
CALCIUM SERPL-MCNC: 9.8 MG/DL — SIGNIFICANT CHANGE UP (ref 8.4–10.5)
CHLORIDE SERPL-SCNC: 102 MMOL/L — SIGNIFICANT CHANGE UP (ref 96–108)
CO2 SERPL-SCNC: 17 MMOL/L — LOW (ref 22–31)
CREAT SERPL-MCNC: 0.83 MG/DL — SIGNIFICANT CHANGE UP (ref 0.5–1.3)
EOSINOPHIL # BLD AUTO: 0.03 K/UL — SIGNIFICANT CHANGE UP (ref 0–0.5)
EOSINOPHIL NFR BLD AUTO: 0.3 % — SIGNIFICANT CHANGE UP (ref 0–6)
FIBRINOGEN PPP-MCNC: 570 MG/DL — HIGH (ref 290–520)
GLUCOSE SERPL-MCNC: 80 MG/DL — SIGNIFICANT CHANGE UP (ref 70–99)
HCT VFR BLD CALC: 31.9 % — LOW (ref 34.5–45)
HGB BLD-MCNC: 10.6 G/DL — LOW (ref 11.5–15.5)
IMM GRANULOCYTES NFR BLD AUTO: 4.1 % — HIGH (ref 0–1.5)
INR BLD: 0.91 RATIO — SIGNIFICANT CHANGE UP (ref 0.88–1.16)
LDH SERPL L TO P-CCNC: 214 U/L — SIGNIFICANT CHANGE UP (ref 50–242)
LYMPHOCYTES # BLD AUTO: 2.78 K/UL — SIGNIFICANT CHANGE UP (ref 1–3.3)
LYMPHOCYTES # BLD AUTO: 29.1 % — SIGNIFICANT CHANGE UP (ref 13–44)
MCHC RBC-ENTMCNC: 28.1 PG — SIGNIFICANT CHANGE UP (ref 27–34)
MCHC RBC-ENTMCNC: 33.2 GM/DL — SIGNIFICANT CHANGE UP (ref 32–36)
MCV RBC AUTO: 84.6 FL — SIGNIFICANT CHANGE UP (ref 80–100)
MONOCYTES # BLD AUTO: 0.62 K/UL — SIGNIFICANT CHANGE UP (ref 0–0.9)
MONOCYTES NFR BLD AUTO: 6.5 % — SIGNIFICANT CHANGE UP (ref 2–14)
NEUTROPHILS # BLD AUTO: 5.7 K/UL — SIGNIFICANT CHANGE UP (ref 1.8–7.4)
NEUTROPHILS NFR BLD AUTO: 59.7 % — SIGNIFICANT CHANGE UP (ref 43–77)
NRBC # BLD: 0 /100 WBCS — SIGNIFICANT CHANGE UP (ref 0–0)
PLATELET # BLD AUTO: 165 K/UL — SIGNIFICANT CHANGE UP (ref 150–400)
POTASSIUM SERPL-MCNC: 4.5 MMOL/L — SIGNIFICANT CHANGE UP (ref 3.5–5.3)
POTASSIUM SERPL-SCNC: 4.5 MMOL/L — SIGNIFICANT CHANGE UP (ref 3.5–5.3)
PROT SERPL-MCNC: 6.1 G/DL — SIGNIFICANT CHANGE UP (ref 6–8.3)
PROTHROM AB SERPL-ACNC: 11 SEC — SIGNIFICANT CHANGE UP (ref 10.6–13.6)
RBC # BLD: 3.77 M/UL — LOW (ref 3.8–5.2)
RBC # FLD: 13.9 % — SIGNIFICANT CHANGE UP (ref 10.3–14.5)
SODIUM SERPL-SCNC: 134 MMOL/L — LOW (ref 135–145)
URATE SERPL-MCNC: 5.5 MG/DL — SIGNIFICANT CHANGE UP (ref 2.5–7)
WBC # BLD: 9.55 K/UL — SIGNIFICANT CHANGE UP (ref 3.8–10.5)
WBC # FLD AUTO: 9.55 K/UL — SIGNIFICANT CHANGE UP (ref 3.8–10.5)

## 2021-05-19 PROCEDURE — 99232 SBSQ HOSP IP/OBS MODERATE 35: CPT | Mod: GC

## 2021-05-19 PROCEDURE — 99231 SBSQ HOSP IP/OBS SF/LOW 25: CPT

## 2021-05-19 RX ORDER — SIMETHICONE 80 MG/1
80 TABLET, CHEWABLE ORAL DAILY
Refills: 0 | Status: DISCONTINUED | OUTPATIENT
Start: 2021-05-19 | End: 2021-05-25

## 2021-05-19 RX ORDER — CALCIUM CARBONATE 500(1250)
1 TABLET ORAL EVERY 6 HOURS
Refills: 0 | Status: DISCONTINUED | OUTPATIENT
Start: 2021-05-19 | End: 2021-05-25

## 2021-05-19 RX ORDER — SENNA PLUS 8.6 MG/1
1 TABLET ORAL DAILY
Refills: 0 | Status: DISCONTINUED | OUTPATIENT
Start: 2021-05-19 | End: 2021-05-19

## 2021-05-19 RX ORDER — SIMETHICONE 80 MG/1
80 TABLET, CHEWABLE ORAL
Refills: 0 | Status: DISCONTINUED | OUTPATIENT
Start: 2021-05-19 | End: 2021-05-19

## 2021-05-19 RX ADMIN — Medication 1 TABLET(S): at 17:00

## 2021-05-19 RX ADMIN — Medication 60 MILLIGRAM(S): at 17:00

## 2021-05-19 RX ADMIN — HEPARIN SODIUM 5000 UNIT(S): 5000 INJECTION INTRAVENOUS; SUBCUTANEOUS at 06:04

## 2021-05-19 RX ADMIN — FAMOTIDINE 20 MILLIGRAM(S): 10 INJECTION INTRAVENOUS at 06:07

## 2021-05-19 RX ADMIN — HEPARIN SODIUM 5000 UNIT(S): 5000 INJECTION INTRAVENOUS; SUBCUTANEOUS at 13:03

## 2021-05-19 RX ADMIN — SIMETHICONE 80 MILLIGRAM(S): 80 TABLET, CHEWABLE ORAL at 13:02

## 2021-05-19 RX ADMIN — FAMOTIDINE 20 MILLIGRAM(S): 10 INJECTION INTRAVENOUS at 13:02

## 2021-05-19 RX ADMIN — Medication 81 MILLIGRAM(S): at 13:02

## 2021-05-19 RX ADMIN — HEPARIN SODIUM 5000 UNIT(S): 5000 INJECTION INTRAVENOUS; SUBCUTANEOUS at 21:53

## 2021-05-19 RX ADMIN — Medication 1 MILLIGRAM(S): at 12:27

## 2021-05-19 RX ADMIN — Medication 1 TABLET(S): at 12:27

## 2021-05-19 NOTE — PROGRESS NOTE ADULT - SUBJECTIVE AND OBJECTIVE BOX
R3 Progress note: HD# 6    Patient seen and examined at bedside, no acute overnight events. Epigastric pain improved. Patient just said she hasn't had a bowel movement in a few days and would like a stool softener.    Pt reports +FM. Denies LOF, VB, ctx, HA, epigastric pain, blurred vision, CP, SOB, N/V, fevers, and chills.    Vital Signs Last 24 Hours  T(C): 36.8 (05-19-21 @ 05:11), Max: 37.1 (05-18-21 @ 17:42)  HR: 111 (05-19-21 @ 05:11) (88 - 114)  BP: 131/73 (05-19-21 @ 05:11) (131/73 - 152/92)  RR: 18 (05-19-21 @ 05:11) (18 - 18)  SpO2: 99% (05-19-21 @ 05:11) (97% - 100%)    CAPILLARY BLOOD GLUCOSE      Physical Exam:  General: NAD  Abdomen: Soft, non-tender, gravid  Ext: No pain or swelling    Labs:             10.8   8.53  )-----------( 187      ( 05-18 @ 09:44 )             32.8     05-18 @ 09:44    135  |  102  |  19  ----------------------------<  87  4.1   |  18  |  0.53    Ca    9.4      05-18 @ 09:44    TPro  6.5  /  Alb  3.6  /  TBili  0.2  /  DBili  x   /  AST  22  /  ALT  20  /  AlkPhos  78  05-18 @ 09:44    PT/INR - ( 05-18 @ 09:44 )   PT: 11.0 sec;   INR: 0.91 ratio    PTT - ( 05-18 @ 09:44 )  PTT:27.6 sec    Uric Acid: (05-18 @ 09:44)  6.0      Fibrinogen: (05-18 @ 09:44)  523      LDH: (05-18 @ 09:44)  186        MEDICATIONS  (STANDING):  aspirin enteric coated 81 milliGRAM(s) Oral daily  famotidine    Tablet 20 milliGRAM(s) Oral daily  folic acid 1 milliGRAM(s) Oral daily  heparin   Injectable 5000 Unit(s) SubCutaneous every 8 hours  NIFEdipine XL 60 milliGRAM(s) Oral daily  prenatal multivitamin 1 Tablet(s) Oral daily    MEDICATIONS  (PRN):  famotidine    Tablet 20 milliGRAM(s) Oral daily PRN reflux, indigestion

## 2021-05-19 NOTE — PROGRESS NOTE ADULT - SUBJECTIVE AND OBJECTIVE BOX
S/24 Events: Patient seen and examined. Denies CP, SOB, dizziness, LH, near syncope or syncope.   No acute events overnight.     O:  T(C): 36.7 (05-19-21 @ 13:40), Max: 37.1 (05-18-21 @ 17:42)  HR: 104 (05-19-21 @ 13:40) (88 - 118)  BP: 148/94 (05-19-21 @ 13:40) (131/73 - 152/92)  RR: 18 (05-19-21 @ 13:40) (18 - 18)  SpO2: 99% (05-19-21 @ 13:40) (97% - 100%)     Daily   Gen: NAD  HEENT: EOMI  CV: RRR, normal S1 + S2, no m/r/g  Lungs: CTAB  Abd: soft, non-tender  Ext: No edema    Labs:                        10.8   8.53  )-----------( 187      ( 18 May 2021 09:44 )             32.8     05-18    135  |  102  |  19  ----------------------------<  87  4.1   |  18<L>  |  0.53    Ca    9.4      18 May 2021 09:44    TPro  6.5  /  Alb  3.6  /  TBili  0.2  /  DBili  x   /  AST  22  /  ALT  20  /  AlkPhos  78  05-18    PT/INR - ( 18 May 2021 09:44 )   PT: 11.0 sec;   INR: 0.91 ratio         PTT - ( 18 May 2021 09:44 )  PTT:27.6 sec       DIagnostics : < from: Transthoracic Echocardiogram (05.15.21 @ 07:39) >  Conclusions:  1. Normal mitral valve. Minimal mitral regurgitation.  2. Normal trileaflet aortic valve. No aortic valve  regurgitation seen.  3. Hyperdynamic left ventricular systolic function.  4. Normal right ventricular size and function.  *** No previous Echo exam.    MEDICATIONS  (STANDING):  aspirin enteric coated 81 milliGRAM(s) Oral daily  famotidine    Tablet 20 milliGRAM(s) Oral daily  folic acid 1 milliGRAM(s) Oral daily  heparin   Injectable 5000 Unit(s) SubCutaneous every 8 hours  NIFEdipine XL 60 milliGRAM(s) Oral daily  prenatal multivitamin 1 Tablet(s) Oral daily  simethicone 80 milliGRAM(s) Chew daily

## 2021-05-19 NOTE — PROGRESS NOTE ADULT - NUTRITIONAL ASSESSMENT
Ms. Wade is a 36 year old female,  at 31 weeks admitted with preeclampsia by severe ranging BPs and worsening proteinuria. Patient previously tachy to 110s-120s. Tachycardia improved. BP mildly elevated yesterday, procardia increased to 60. Patient denies symptoms of preeclampsia.     Hypertension  Blood pressure within range this AM  Continue on Procardia XL 60 mg daily  If BP rises above 150/90, can consider increasing Procardia to   30 mg QAM and 60 mg QPM  or starting Labetalol to address hypertension and tachcyardia

## 2021-05-19 NOTE — PROGRESS NOTE ADULT - ASSESSMENT
A/P: Pt is a 37yo  at 31.1w admitted with siPEC by severe range BPs and worsening proteinuria. Patient previously tachy to 110s-120s. Tachycardia improved. BP mildly elevated yesterday, procardia increased to 60. Patient denies symptoms of preeclampsia. Said she feels constipated and would like a stool softener.    #siPEC  - s/p Mag x 24 hours for seizure ppx  - s/p Lab 20 IVP (5/15)   - c/w Procardia XL 60 QD  - TTE (5/15): EF 75%, hyperdynamic left ventricle   - Cardio-OB recs to increase BP meds if persistent BP IN 150s/90s  - s/p BMZ (-5/15) for FLM    #Maternal tachycardia  - Tachycardia improved  - TSH: 5.54  - Free T4: 0.9  - Continuos tele/pulse ox   - Bedside sono neg for B lines    #Fetal well being  - NST BID   - s/p BMZ (-5/15) for FLM   - Appreciate NICU consult   - BPP 2x/week  - ATU Sono (): Vtx, post placenta, AMAURY 10, PARSON 8/8 wt 1509g (26%)  - f/u GBS ()    #Maternal well being  - Reg diet  - HSQ for DVT ppx  - PNV, folic acid, ASA   - Pepcid QD  - Simethicone for constipation/gas    #MOD  - Prior C/S x2  - MOD: Repeat C/S at 34 weeks or sooner PRN maternal or fetal concerns  - BCM: desires Depo, declines tubal    Msantandreu PGY3   A/P: Pt is a 35yo  at 31.1w admitted with siPEC by severe range BPs and worsening proteinuria. Patient previously tachy to 110s-120s. Tachycardia improved. BP mildly elevated yesterday, procardia increased to 60. Patient denies symptoms of preeclampsia. Said she feels constipated and would like a stool softener.    #siPEC  - s/p Mag x 24 hours for seizure ppx  - s/p Lab 20 IVP (5/15)   - c/w Procardia XL 60 QD  - TTE (5/15): EF 75%, hyperdynamic left ventricle   - Cardio-OB recs to increase BP meds if persistent BP IN 150s/90s  - s/p BMZ (-5/15) for FLM    #Maternal tachycardia  - Tachycardia improved  - TSH: 5.54  - Free T4: 0.9  - Continuos tele/pulse ox   - Bedside sono neg for B lines    #Fetal well being  - NST BID   - s/p BMZ (-5/15) for FLM   - Appreciate NICU consult   - BPP 2x/week  - ATU Sono (): Vtx, post placenta, AMAURY 10, PARSON 8/8 wt 1509g (26%)  - f/u GBS ()    #Maternal well being  - Reg diet  - HSQ for DVT ppx  - PNV, folic acid, ASA   - Pepcid QD  - Senna for constipation    #MOD  - Prior C/S x2  - MOD: Repeat C/S at 34 weeks or sooner PRN maternal or fetal concerns  - BCM: desires Depo, declines tubal    Msantandreu PGY3   A/P: Pt is a 37yo  at 31.1w admitted with siPEC by severe range BPs and worsening proteinuria. Patient previously tachy to 110s-120s. Tachycardia improved. BP mildly elevated yesterday, procardia increased to 60. Patient denies symptoms of preeclampsia. Said she feels constipated and would like a stool softener.    #siPEC  - s/p Mag x 24 hours for seizure ppx  - s/p Lab 20 IVP (5/15)   - c/w Procardia XL 60 QD  - TTE (5/15): EF 75%, hyperdynamic left ventricle   - Cardio-OB recs to increase BP meds if persistent BP IN 150s/90s  - s/p BMZ (-5/15) for FLM    #Maternal tachycardia  - Tachycardia improved  - TSH: 5.54  - Free T4: 0.9  - Continuos tele/pulse ox   - Bedside sono neg for B lines    #Fetal well being  - NST BID   - s/p BMZ (-5/15) for FLM   - Appreciate NICU consult   - BPP 2x/week  - ATU Sono (): Vtx, post placenta, AMAURY 10, PARSON 8/8 wt 1509g (26%)  - f/u GBS ()    #Maternal well being  - Reg diet  - HSQ for DVT ppx  - PNV, folic acid, ASA   - Pepcid QD  - Senna for constipation    #MOD  - Prior C/S x2  - MOD: Repeat C/S at 34 weeks or sooner PRN maternal or fetal concerns  - BCM: desires Depo, declines tubal    Msantandreu PGY3      MFM Fellow Addendum  37 yo  at 31 weeks 1 day gestation admitted with chronic hypertension with superimposed preeclampsia with severe features. Patient is asymptomatic. BPs stable on Procardia 60 mg XL. Persistent tachycardia, sinus tach on EKG. FHR reactive with reassuring fetal status. Plan to scheduled CD at 34 weeks gestation. Patient seen and counseled with Dr. Beltran.   Vee Valle MD  Fellow, Maternal Fetal Medicine

## 2021-05-20 ENCOUNTER — APPOINTMENT (OUTPATIENT)
Dept: CARDIOLOGY | Facility: HOSPITAL | Age: 36
End: 2021-05-20

## 2021-05-20 ENCOUNTER — TRANSCRIPTION ENCOUNTER (OUTPATIENT)
Age: 36
End: 2021-05-20

## 2021-05-20 LAB
ALBUMIN SERPL ELPH-MCNC: 2.9 G/DL — LOW (ref 3.3–5)
ALBUMIN SERPL ELPH-MCNC: 3.3 G/DL — SIGNIFICANT CHANGE UP (ref 3.3–5)
ALP SERPL-CCNC: 73 U/L — SIGNIFICANT CHANGE UP (ref 40–120)
ALP SERPL-CCNC: 90 U/L — SIGNIFICANT CHANGE UP (ref 40–120)
ALT FLD-CCNC: 22 U/L — SIGNIFICANT CHANGE UP (ref 10–45)
ALT FLD-CCNC: 31 U/L — SIGNIFICANT CHANGE UP (ref 10–45)
ANION GAP SERPL CALC-SCNC: 14 MMOL/L — SIGNIFICANT CHANGE UP (ref 5–17)
ANION GAP SERPL CALC-SCNC: 15 MMOL/L — SIGNIFICANT CHANGE UP (ref 5–17)
APTT BLD: 23.4 SEC — LOW (ref 27.5–35.5)
APTT BLD: 29 SEC — SIGNIFICANT CHANGE UP (ref 27.5–35.5)
AST SERPL-CCNC: 23 U/L — SIGNIFICANT CHANGE UP (ref 10–40)
AST SERPL-CCNC: 34 U/L — SIGNIFICANT CHANGE UP (ref 10–40)
BASOPHILS # BLD AUTO: 0 K/UL — SIGNIFICANT CHANGE UP (ref 0–0.2)
BASOPHILS # BLD AUTO: 0.02 K/UL — SIGNIFICANT CHANGE UP (ref 0–0.2)
BASOPHILS NFR BLD AUTO: 0 % — SIGNIFICANT CHANGE UP (ref 0–2)
BASOPHILS NFR BLD AUTO: 0.2 % — SIGNIFICANT CHANGE UP (ref 0–2)
BILIRUB SERPL-MCNC: 0.2 MG/DL — SIGNIFICANT CHANGE UP (ref 0.2–1.2)
BILIRUB SERPL-MCNC: 0.3 MG/DL — SIGNIFICANT CHANGE UP (ref 0.2–1.2)
BLD GP AB SCN SERPL QL: POSITIVE — SIGNIFICANT CHANGE UP
BUN SERPL-MCNC: 12 MG/DL — SIGNIFICANT CHANGE UP (ref 7–23)
BUN SERPL-MCNC: 14 MG/DL — SIGNIFICANT CHANGE UP (ref 7–23)
CALCIUM SERPL-MCNC: 10.6 MG/DL — HIGH (ref 8.4–10.5)
CALCIUM SERPL-MCNC: 9 MG/DL — SIGNIFICANT CHANGE UP (ref 8.4–10.5)
CHLORIDE SERPL-SCNC: 101 MMOL/L — SIGNIFICANT CHANGE UP (ref 96–108)
CHLORIDE SERPL-SCNC: 103 MMOL/L — SIGNIFICANT CHANGE UP (ref 96–108)
CO2 SERPL-SCNC: 17 MMOL/L — LOW (ref 22–31)
CO2 SERPL-SCNC: 17 MMOL/L — LOW (ref 22–31)
CREAT SERPL-MCNC: 0.45 MG/DL — LOW (ref 0.5–1.3)
CREAT SERPL-MCNC: 0.51 MG/DL — SIGNIFICANT CHANGE UP (ref 0.5–1.3)
EOSINOPHIL # BLD AUTO: 0 K/UL — SIGNIFICANT CHANGE UP (ref 0–0.5)
EOSINOPHIL # BLD AUTO: 0.04 K/UL — SIGNIFICANT CHANGE UP (ref 0–0.5)
EOSINOPHIL NFR BLD AUTO: 0 % — SIGNIFICANT CHANGE UP (ref 0–6)
EOSINOPHIL NFR BLD AUTO: 0.5 % — SIGNIFICANT CHANGE UP (ref 0–6)
FIBRINOGEN PPP-MCNC: 667 MG/DL — HIGH (ref 290–520)
FIBRINOGEN PPP-MCNC: 886 MG/DL — HIGH (ref 290–520)
GLUCOSE SERPL-MCNC: 67 MG/DL — LOW (ref 70–99)
GLUCOSE SERPL-MCNC: 96 MG/DL — SIGNIFICANT CHANGE UP (ref 70–99)
HCT VFR BLD CALC: 31.4 % — LOW (ref 34.5–45)
HCT VFR BLD CALC: 34.7 % — SIGNIFICANT CHANGE UP (ref 34.5–45)
HGB BLD-MCNC: 10.4 G/DL — LOW (ref 11.5–15.5)
HGB BLD-MCNC: 11.7 G/DL — SIGNIFICANT CHANGE UP (ref 11.5–15.5)
IMM GRANULOCYTES NFR BLD AUTO: 3.8 % — HIGH (ref 0–1.5)
INR BLD: 0.93 RATIO — SIGNIFICANT CHANGE UP (ref 0.88–1.16)
INR BLD: 0.94 RATIO — SIGNIFICANT CHANGE UP (ref 0.88–1.16)
LDH SERPL L TO P-CCNC: 226 U/L — SIGNIFICANT CHANGE UP (ref 50–242)
LDH SERPL L TO P-CCNC: 328 U/L — HIGH (ref 50–242)
LYMPHOCYTES # BLD AUTO: 1.11 K/UL — SIGNIFICANT CHANGE UP (ref 1–3.3)
LYMPHOCYTES # BLD AUTO: 12.3 % — LOW (ref 13–44)
LYMPHOCYTES # BLD AUTO: 2.5 K/UL — SIGNIFICANT CHANGE UP (ref 1–3.3)
LYMPHOCYTES # BLD AUTO: 29 % — SIGNIFICANT CHANGE UP (ref 13–44)
MCHC RBC-ENTMCNC: 27.7 PG — SIGNIFICANT CHANGE UP (ref 27–34)
MCHC RBC-ENTMCNC: 28.4 PG — SIGNIFICANT CHANGE UP (ref 27–34)
MCHC RBC-ENTMCNC: 33.1 GM/DL — SIGNIFICANT CHANGE UP (ref 32–36)
MCHC RBC-ENTMCNC: 33.7 GM/DL — SIGNIFICANT CHANGE UP (ref 32–36)
MCV RBC AUTO: 83.5 FL — SIGNIFICANT CHANGE UP (ref 80–100)
MCV RBC AUTO: 84.2 FL — SIGNIFICANT CHANGE UP (ref 80–100)
MONOCYTES # BLD AUTO: 0.57 K/UL — SIGNIFICANT CHANGE UP (ref 0–0.9)
MONOCYTES # BLD AUTO: 0.63 K/UL — SIGNIFICANT CHANGE UP (ref 0–0.9)
MONOCYTES NFR BLD AUTO: 6.6 % — SIGNIFICANT CHANGE UP (ref 2–14)
MONOCYTES NFR BLD AUTO: 7 % — SIGNIFICANT CHANGE UP (ref 2–14)
NEUTROPHILS # BLD AUTO: 5.16 K/UL — SIGNIFICANT CHANGE UP (ref 1.8–7.4)
NEUTROPHILS # BLD AUTO: 7.04 K/UL — SIGNIFICANT CHANGE UP (ref 1.8–7.4)
NEUTROPHILS NFR BLD AUTO: 59.9 % — SIGNIFICANT CHANGE UP (ref 43–77)
NEUTROPHILS NFR BLD AUTO: 78 % — HIGH (ref 43–77)
NRBC # BLD: 0 /100 WBCS — SIGNIFICANT CHANGE UP (ref 0–0)
PLATELET # BLD AUTO: 131 K/UL — LOW (ref 150–400)
PLATELET # BLD AUTO: 134 K/UL — LOW (ref 150–400)
POTASSIUM SERPL-MCNC: 4.1 MMOL/L — SIGNIFICANT CHANGE UP (ref 3.5–5.3)
POTASSIUM SERPL-MCNC: 4.6 MMOL/L — SIGNIFICANT CHANGE UP (ref 3.5–5.3)
POTASSIUM SERPL-SCNC: 4.1 MMOL/L — SIGNIFICANT CHANGE UP (ref 3.5–5.3)
POTASSIUM SERPL-SCNC: 4.6 MMOL/L — SIGNIFICANT CHANGE UP (ref 3.5–5.3)
PROT SERPL-MCNC: 5.4 G/DL — LOW (ref 6–8.3)
PROT SERPL-MCNC: 6.2 G/DL — SIGNIFICANT CHANGE UP (ref 6–8.3)
PROTHROM AB SERPL-ACNC: 11.2 SEC — SIGNIFICANT CHANGE UP (ref 10.6–13.6)
PROTHROM AB SERPL-ACNC: 11.3 SEC — SIGNIFICANT CHANGE UP (ref 10.6–13.6)
RBC # BLD: 3.76 M/UL — LOW (ref 3.8–5.2)
RBC # BLD: 4.12 M/UL — SIGNIFICANT CHANGE UP (ref 3.8–5.2)
RBC # FLD: 13.9 % — SIGNIFICANT CHANGE UP (ref 10.3–14.5)
RBC # FLD: 14.1 % — SIGNIFICANT CHANGE UP (ref 10.3–14.5)
RH IG SCN BLD-IMP: NEGATIVE — SIGNIFICANT CHANGE UP
SODIUM SERPL-SCNC: 133 MMOL/L — LOW (ref 135–145)
SODIUM SERPL-SCNC: 134 MMOL/L — LOW (ref 135–145)
URATE SERPL-MCNC: 5.1 MG/DL — SIGNIFICANT CHANGE UP (ref 2.5–7)
URATE SERPL-MCNC: 5.4 MG/DL — SIGNIFICANT CHANGE UP (ref 2.5–7)
WBC # BLD: 8.62 K/UL — SIGNIFICANT CHANGE UP (ref 3.8–10.5)
WBC # BLD: 9.02 K/UL — SIGNIFICANT CHANGE UP (ref 3.8–10.5)
WBC # FLD AUTO: 8.62 K/UL — SIGNIFICANT CHANGE UP (ref 3.8–10.5)
WBC # FLD AUTO: 9.02 K/UL — SIGNIFICANT CHANGE UP (ref 3.8–10.5)

## 2021-05-20 PROCEDURE — 99231 SBSQ HOSP IP/OBS SF/LOW 25: CPT

## 2021-05-20 PROCEDURE — 86077 PHYS BLOOD BANK SERV XMATCH: CPT

## 2021-05-20 RX ORDER — SODIUM CHLORIDE 9 MG/ML
1000 INJECTION, SOLUTION INTRAVENOUS
Refills: 0 | Status: DISCONTINUED | OUTPATIENT
Start: 2021-05-20 | End: 2021-05-20

## 2021-05-20 RX ORDER — LABETALOL HCL 100 MG
20 TABLET ORAL ONCE
Refills: 0 | Status: COMPLETED | OUTPATIENT
Start: 2021-05-20 | End: 2021-05-20

## 2021-05-20 RX ORDER — NIFEDIPINE 30 MG
30 TABLET, EXTENDED RELEASE 24 HR ORAL EVERY 24 HOURS
Refills: 0 | Status: DISCONTINUED | OUTPATIENT
Start: 2021-05-21 | End: 2021-05-25

## 2021-05-20 RX ORDER — SODIUM CHLORIDE 0.65 %
1 AEROSOL, SPRAY (ML) NASAL
Refills: 0 | Status: DISCONTINUED | OUTPATIENT
Start: 2021-05-20 | End: 2021-05-25

## 2021-05-20 RX ORDER — LABETALOL HCL 100 MG
40 TABLET ORAL ONCE
Refills: 0 | Status: COMPLETED | OUTPATIENT
Start: 2021-05-20 | End: 2021-05-20

## 2021-05-20 RX ORDER — DIPHENHYDRAMINE HCL 50 MG
25 CAPSULE ORAL ONCE
Refills: 0 | Status: COMPLETED | OUTPATIENT
Start: 2021-05-20 | End: 2021-05-20

## 2021-05-20 RX ORDER — METOCLOPRAMIDE HCL 10 MG
10 TABLET ORAL ONCE
Refills: 0 | Status: COMPLETED | OUTPATIENT
Start: 2021-05-20 | End: 2021-05-20

## 2021-05-20 RX ORDER — ACETAMINOPHEN 500 MG
975 TABLET ORAL ONCE
Refills: 0 | Status: COMPLETED | OUTPATIENT
Start: 2021-05-20 | End: 2021-05-20

## 2021-05-20 RX ADMIN — FAMOTIDINE 20 MILLIGRAM(S): 10 INJECTION INTRAVENOUS at 11:52

## 2021-05-20 RX ADMIN — HEPARIN SODIUM 5000 UNIT(S): 5000 INJECTION INTRAVENOUS; SUBCUTANEOUS at 14:02

## 2021-05-20 RX ADMIN — HEPARIN SODIUM 5000 UNIT(S): 5000 INJECTION INTRAVENOUS; SUBCUTANEOUS at 22:34

## 2021-05-20 RX ADMIN — Medication 10 MILLIGRAM(S): at 20:20

## 2021-05-20 RX ADMIN — Medication 40 MILLIGRAM(S): at 17:33

## 2021-05-20 RX ADMIN — Medication 1 MILLIGRAM(S): at 11:54

## 2021-05-20 RX ADMIN — Medication 20 MILLIGRAM(S): at 15:45

## 2021-05-20 RX ADMIN — Medication 81 MILLIGRAM(S): at 11:52

## 2021-05-20 RX ADMIN — Medication 975 MILLIGRAM(S): at 16:41

## 2021-05-20 RX ADMIN — Medication 1 TABLET(S): at 11:54

## 2021-05-20 RX ADMIN — Medication 25 MILLIGRAM(S): at 16:42

## 2021-05-20 RX ADMIN — Medication 1 TABLET(S): at 10:14

## 2021-05-20 RX ADMIN — HEPARIN SODIUM 5000 UNIT(S): 5000 INJECTION INTRAVENOUS; SUBCUTANEOUS at 05:57

## 2021-05-20 RX ADMIN — Medication 60 MILLIGRAM(S): at 16:42

## 2021-05-20 NOTE — PROVIDER CONTACT NOTE (CHANGE IN STATUS NOTIFICATION) - BACKGROUND
PEC CHTN 31.1wks baseline 's
31 2/7 gestation, PEC. Last Procardia 60 given @ 1700 on 5/19/21.
PEC 31wks on procardia 60xl daily
PEC 31wks on procardia 60xl daily

## 2021-05-20 NOTE — PROVIDER CONTACT NOTE (CHANGE IN STATUS NOTIFICATION) - SITUATION
Pt stated having epigastric pain that was unresolved with pepcid and mylicon given at 1300.
Elevated BP of 173/100, . Sinus pressure.

## 2021-05-20 NOTE — PROVIDER CONTACT NOTE (CHANGE IN STATUS NOTIFICATION) - ACTION/TREATMENT ORDERED:
Pt given dose of procardia 60xl now will continue to monitor BP
MD will come to bedside to examine pt and order HELLP labs
Will continue to monitor BP and HR
Repeat in 15 minutes

## 2021-05-20 NOTE — CHART NOTE - NSCHARTNOTEFT_GEN_A_CORE
The patient was evaluated at bedside for tachycardia to 120s the patient said that she was having a headache. BP was evaluated and found to be severe range. She denied changes in vision, CP, SOB, palpitations, abd pain. Repeat BP 160s. She was given Labetalol 20IVP and placed on the fetal monitor w/ Cat 1 tracing. BP improved to 140s/80s. She was transferred to L&D. On arrival to L&D, HA improved; however, BP 160s/110s. Will repeat    A/P  -NPO  -STAT HELLP Labs  -BP Q15min  -Monitor VS  -Tylenol for HA    zayra pgy3 d/w Dr. Osman

## 2021-05-20 NOTE — CHART NOTE - NSCHARTNOTEFT_GEN_A_CORE
R3 Chart Note    Patient seen and evaluated at bedside. Reports headache significantly improved. Denies blurry vision, RUQ Pain, edema.    Vital Signs Last 24 Hrs  T(C): 37.0 (20 May 2021 20:57), Max: 37.3 (20 May 2021 16:10)  T(F): 98.6 (20 May 2021 20:57), Max: 99.14 (20 May 2021 16:10)  HR: 117 (20 May 2021 21:11) (84 - 126)  BP: 121/64 (20 May 2021 21:04) (119/69 - 173/110)  BP(mean): --  RR: 17 (20 May 2021 20:57) (16 - 18)  SpO2: 97% (20 May 2021 21:11) (94% - 100%)    Gen: NAD  Cards: RRR  Pulm: CTAB  Abd: soft, non-tender, gravid  Ext: warm, well perfused    A/P: Pt is a 35yo  at 31w2d admitted with siPEC by severe range BPs and worsening proteinuria. Patient previously tachy to 110s-120s. Tachycardia improved. BPs elevated today requiring Labetalol 20 and 40 IVP. Also with HA earlier today, brought to floor for continuous monitoring  - BPs R3 Chart Note    Patient seen and evaluated at bedside. Reports headache significantly improved. Denies blurry vision, RUQ Pain, edema.    Vital Signs Last 24 Hrs  T(C): 37.0 (20 May 2021 20:57), Max: 37.3 (20 May 2021 16:10)  T(F): 98.6 (20 May 2021 20:57), Max: 99.14 (20 May 2021 16:10)  HR: 117 (20 May 2021 21:11) (84 - 126)  BP: 121/64 (20 May 2021 21:04) (119/69 - 173/110)  BP(mean): --  RR: 17 (20 May 2021 20:57) (16 - 18)  SpO2: 97% (20 May 2021 21:11) (94% - 100%)    Gen: NAD  Cards: RRR  Pulm: CTAB  Abd: soft, non-tender, gravid  Ext: warm, well perfused    A/P: Pt is a 37yo  at 31w2d admitted with siPEC by severe range BPs and worsening proteinuria. Patient previously tachy to 110s-120s. Tachycardia improved. BPs elevated today requiring Labetalol 20 and 40 IVP. Also with HA earlier today, brought to floor for continuous monitoring  - BPs improved  - Procardia increased to 30 QAM / 60 QHS  - HA improved with Tylenol and Reglan  - BPs stable at this time  - Fetal status reassuring  - NST -> transfer to [facility]    BRITNEY Peterson PGY3  d/w Dr. Martinez  c/w Dr. Love & Dr. Beltran [facility] Yes-Patient/Caregiver accepts free interpretation services...

## 2021-05-20 NOTE — PROGRESS NOTE ADULT - SUBJECTIVE AND OBJECTIVE BOX
R3 Progress note: HD# 7    Patient seen and examined at bedside, no acute overnight events. Epigastric pain improved w/ tums & tea.    Pt reports +FM. Denies LOF, VB, ctx, HA, epigastric pain, blurred vision, CP, SOB, N/V, fevers, and chills.    Vital Signs Last 24 Hours  T(C): 36.6 (05-20-21 @ 05:15), Max: 37 (05-20-21 @ 01:06)  HR: 98 (05-20-21 @ 05:15) (84 - 118)  BP: 141/92 (05-20-21 @ 05:15) (132/85 - 158/98)  RR: 18 (05-20-21 @ 05:15) (18 - 18)  SpO2: 97% (05-20-21 @ 05:15) (97% - 100%)    CAPILLARY BLOOD GLUCOSE      Physical Exam:  General: NAD  Abdomen: Soft, non-tender, gravid  Ext: No pain or swelling    Labs:             10.6   9.55  )-----------( 165      ( 05-19 @ 17:14 )             31.9     05-19 @ 17:14    134  |  102  |  14  ----------------------------<  80  4.5   |  17  |  0.83    Ca    9.8      05-19 @ 17:14    TPro  6.1  /  Alb  3.3  /  TBili  0.2  /  DBili  x   /  AST  28  /  ALT  28  /  AlkPhos  79  05-19 @ 17:14    PT/INR - ( 05-19 @ 17:14 )   PT: 11.0 sec;   INR: 0.91 ratio    PTT - ( 05-19 @ 17:14 )  PTT:31.2 sec    Uric Acid: (05-19 @ 17:14)  5.5      Fibrinogen: (05-19 @ 17:14)  570      LDH: (05-19 @ 17:14)  214        MEDICATIONS  (STANDING):  aspirin enteric coated 81 milliGRAM(s) Oral daily  famotidine    Tablet 20 milliGRAM(s) Oral daily  folic acid 1 milliGRAM(s) Oral daily  heparin   Injectable 5000 Unit(s) SubCutaneous every 8 hours  NIFEdipine XL 60 milliGRAM(s) Oral daily  prenatal multivitamin 1 Tablet(s) Oral daily  simethicone 80 milliGRAM(s) Chew daily    MEDICATIONS  (PRN):  calcium carbonate    500 mG (Tums) Chewable 1 Tablet(s) Chew every 6 hours PRN Heartburn  famotidine    Tablet 20 milliGRAM(s) Oral daily PRN reflux, indigestion

## 2021-05-20 NOTE — PROVIDER CONTACT NOTE (CHANGE IN STATUS NOTIFICATION) - ASSESSMENT
Pt denies any headache, dizziness, and blurry vision. pt denies any chest pain and tachycardia
Elevated BP of 173/100, . Sinus pressure.
Pt denies any headache, dizziness, and blurry vision
Pt denies any headache, dizziness, and blurry vision

## 2021-05-20 NOTE — PROGRESS NOTE ADULT - ASSESSMENT
A/P: Pt is a 35yo  at 31.2w admitted with siPEC by severe range BPs and worsening proteinuria. Patient previously tachy to 110s-120s. Tachycardia improved. BP mildly elevated yesterday, procardia increased to 60. Patient denies symptoms of preeclampsia. She has epigastric pain improved with medications.     #siPEC  - s/p Mag x 24 hours for seizure ppx  - s/p Lab 20 IVP (5/15)   - c/w Procardia XL 60 QD  - TTE (5/15): EF 75%, hyperdynamic left ventricle   - Cardio-OB recs to increase BP meds if persistent BP IN 150s/90s  - s/p BMZ (-5/15) for FLM  -HELLP wnl, continue Q3D or PRN    #Maternal tachycardia  - Tachycardia improved  - TSH: 5.54  - Free T4: 0.9  - Continuos tele/pulse ox   - Bedside sono neg for B lines    #Fetal well being  - NST BID   - s/p BMZ (-5/15) for FLM   - Appreciate NICU consult   - BPP 2x/week  - ATU Sono (): Vtx, post placenta, AMAURY 10, PARSON 8/8 wt 1509g (26%)  - GBS Neg ()    #Maternal well being  - Reg diet  - HSQ for DVT ppx  - PNV, folic acid, ASA   - Pepcid QD, TUms  -Monitor Epigastric pain    #MOD  - Prior C/S x2  - MOD: Repeat C/S at 34 weeks or sooner PRN maternal or fetal concerns  - BCM: desires Depo, declines tubal    Msantandreu PGY3 A/P: Pt is a 37yo  at 31.2w admitted with siPEC by severe range BPs and worsening proteinuria. Patient previously tachy to 110s-120s. Tachycardia improved. BP mildly elevated yesterday, procardia increased to 60. Patient denies symptoms of preeclampsia. She has epigastric pain improved with medications.     #siPEC  - s/p Mag x 24 hours for seizure ppx  - s/p Lab 20 IVP (5/15)   - c/w Procardia XL 60 QD  - TTE (5/15): EF 75%, hyperdynamic left ventricle   - Cardio-OB recs to increase BP meds if persistent BP IN 150s/90s  - s/p BMZ (-5/15) for FLM  -HELLP wnl, continue Q3D or PRN    #Maternal tachycardia  - Tachycardia improved  - TSH: 5.54  - Free T4: 0.9  - Continuos tele/pulse ox   - Bedside sono neg for B lines    #Fetal well being  - NST BID   - s/p BMZ (-5/15) for FLM   - Appreciate NICU consult   - BPP 2x/week  - ATU Sono (): Vtx, post placenta, AMAURY 10, PARSON 8/8 wt 1509g (26%)  - GBS Neg ()    #Maternal well being  - Reg diet  - HSQ for DVT ppx  - PNV, folic acid, ASA   - Pepcid QD, TUms  -Monitor Epigastric pain    #MOD  - Prior C/S x2  - MOD: Repeat C/S at 34 weeks or sooner PRN maternal or fetal concerns  - BCM: desires Depo, declines tubal    Msantandreu PGY3    MFM Fellow Addendum  35 yo  at 31 weeks 2 day gestation admitted with chronic hypertension with superimposed preeclampsia with severe features. Patient is asymptomatic. BPs stable on Procardia 60 mg XL. Persistent tachycardia, sinus tach on EKG. FHR reactive with reassuring fetal status. Platelets decreased from previous lab draw. Will assess for stability today. Plan to schedule CD at 34 weeks gestation. Patient seen and counseled with Dr. Beltran.   Vee Valle MD  Fellow, Maternal Fetal Medicine

## 2021-05-21 LAB
ALBUMIN SERPL ELPH-MCNC: 2.9 G/DL — LOW (ref 3.3–5)
ALBUMIN SERPL ELPH-MCNC: 3.1 G/DL — LOW (ref 3.3–5)
ALP SERPL-CCNC: 83 U/L — SIGNIFICANT CHANGE UP (ref 40–120)
ALP SERPL-CCNC: 85 U/L — SIGNIFICANT CHANGE UP (ref 40–120)
ALT FLD-CCNC: 37 U/L — SIGNIFICANT CHANGE UP (ref 10–45)
ALT FLD-CCNC: 46 U/L — HIGH (ref 10–45)
ANION GAP SERPL CALC-SCNC: 13 MMOL/L — SIGNIFICANT CHANGE UP (ref 5–17)
ANION GAP SERPL CALC-SCNC: 17 MMOL/L — SIGNIFICANT CHANGE UP (ref 5–17)
APTT BLD: 33.9 SEC — SIGNIFICANT CHANGE UP (ref 27.5–35.5)
APTT BLD: 39.6 SEC — HIGH (ref 27.5–35.5)
AST SERPL-CCNC: 36 U/L — SIGNIFICANT CHANGE UP (ref 10–40)
AST SERPL-CCNC: 41 U/L — HIGH (ref 10–40)
BASOPHILS # BLD AUTO: 0.01 K/UL — SIGNIFICANT CHANGE UP (ref 0–0.2)
BASOPHILS # BLD AUTO: 0.06 K/UL — SIGNIFICANT CHANGE UP (ref 0–0.2)
BASOPHILS NFR BLD AUTO: 0.1 % — SIGNIFICANT CHANGE UP (ref 0–2)
BASOPHILS NFR BLD AUTO: 0.8 % — SIGNIFICANT CHANGE UP (ref 0–2)
BILIRUB SERPL-MCNC: 0.2 MG/DL — SIGNIFICANT CHANGE UP (ref 0.2–1.2)
BILIRUB SERPL-MCNC: 0.3 MG/DL — SIGNIFICANT CHANGE UP (ref 0.2–1.2)
BUN SERPL-MCNC: 15 MG/DL — SIGNIFICANT CHANGE UP (ref 7–23)
BUN SERPL-MCNC: 17 MG/DL — SIGNIFICANT CHANGE UP (ref 7–23)
CALCIUM SERPL-MCNC: 10.5 MG/DL — SIGNIFICANT CHANGE UP (ref 8.4–10.5)
CALCIUM SERPL-MCNC: 9.6 MG/DL — SIGNIFICANT CHANGE UP (ref 8.4–10.5)
CHLORIDE SERPL-SCNC: 102 MMOL/L — SIGNIFICANT CHANGE UP (ref 96–108)
CHLORIDE SERPL-SCNC: 102 MMOL/L — SIGNIFICANT CHANGE UP (ref 96–108)
CO2 SERPL-SCNC: 17 MMOL/L — LOW (ref 22–31)
CO2 SERPL-SCNC: 19 MMOL/L — LOW (ref 22–31)
CREAT SERPL-MCNC: 0.55 MG/DL — SIGNIFICANT CHANGE UP (ref 0.5–1.3)
CREAT SERPL-MCNC: 0.59 MG/DL — SIGNIFICANT CHANGE UP (ref 0.5–1.3)
EOSINOPHIL # BLD AUTO: 0.04 K/UL — SIGNIFICANT CHANGE UP (ref 0–0.5)
EOSINOPHIL # BLD AUTO: 0.2 K/UL — SIGNIFICANT CHANGE UP (ref 0–0.5)
EOSINOPHIL NFR BLD AUTO: 0.6 % — SIGNIFICANT CHANGE UP (ref 0–6)
EOSINOPHIL NFR BLD AUTO: 2.6 % — SIGNIFICANT CHANGE UP (ref 0–6)
FIBRINOGEN PPP-MCNC: 864 MG/DL — HIGH (ref 290–520)
FIBRINOGEN PPP-MCNC: 926 MG/DL — HIGH (ref 290–520)
GLUCOSE SERPL-MCNC: 64 MG/DL — LOW (ref 70–99)
GLUCOSE SERPL-MCNC: 78 MG/DL — SIGNIFICANT CHANGE UP (ref 70–99)
HCT VFR BLD CALC: 32.9 % — LOW (ref 34.5–45)
HCT VFR BLD CALC: 33.6 % — LOW (ref 34.5–45)
HGB BLD-MCNC: 10.9 G/DL — LOW (ref 11.5–15.5)
HGB BLD-MCNC: 11 G/DL — LOW (ref 11.5–15.5)
IMM GRANULOCYTES NFR BLD AUTO: 1.5 % — SIGNIFICANT CHANGE UP (ref 0–1.5)
INR BLD: 0.97 RATIO — SIGNIFICANT CHANGE UP (ref 0.88–1.16)
INR BLD: 1.01 RATIO — SIGNIFICANT CHANGE UP (ref 0.88–1.16)
LDH SERPL L TO P-CCNC: 256 U/L — HIGH (ref 50–242)
LDH SERPL L TO P-CCNC: 278 U/L — HIGH (ref 50–242)
LYMPHOCYTES # BLD AUTO: 1.47 K/UL — SIGNIFICANT CHANGE UP (ref 1–3.3)
LYMPHOCYTES # BLD AUTO: 19 % — SIGNIFICANT CHANGE UP (ref 13–44)
LYMPHOCYTES # BLD AUTO: 2.44 K/UL — SIGNIFICANT CHANGE UP (ref 1–3.3)
LYMPHOCYTES # BLD AUTO: 33.8 % — SIGNIFICANT CHANGE UP (ref 13–44)
MCHC RBC-ENTMCNC: 27.4 PG — SIGNIFICANT CHANGE UP (ref 27–34)
MCHC RBC-ENTMCNC: 28 PG — SIGNIFICANT CHANGE UP (ref 27–34)
MCHC RBC-ENTMCNC: 32.7 GM/DL — SIGNIFICANT CHANGE UP (ref 32–36)
MCHC RBC-ENTMCNC: 33.1 GM/DL — SIGNIFICANT CHANGE UP (ref 32–36)
MCV RBC AUTO: 83.8 FL — SIGNIFICANT CHANGE UP (ref 80–100)
MCV RBC AUTO: 84.6 FL — SIGNIFICANT CHANGE UP (ref 80–100)
MONOCYTES # BLD AUTO: 0.13 K/UL — SIGNIFICANT CHANGE UP (ref 0–0.9)
MONOCYTES # BLD AUTO: 0.53 K/UL — SIGNIFICANT CHANGE UP (ref 0–0.9)
MONOCYTES NFR BLD AUTO: 1.7 % — LOW (ref 2–14)
MONOCYTES NFR BLD AUTO: 7.4 % — SIGNIFICANT CHANGE UP (ref 2–14)
NEUTROPHILS # BLD AUTO: 4.08 K/UL — SIGNIFICANT CHANGE UP (ref 1.8–7.4)
NEUTROPHILS # BLD AUTO: 5.86 K/UL — SIGNIFICANT CHANGE UP (ref 1.8–7.4)
NEUTROPHILS NFR BLD AUTO: 56.6 % — SIGNIFICANT CHANGE UP (ref 43–77)
NEUTROPHILS NFR BLD AUTO: 75 % — SIGNIFICANT CHANGE UP (ref 43–77)
NRBC # BLD: 0 /100 WBCS — SIGNIFICANT CHANGE UP (ref 0–0)
PLATELET # BLD AUTO: 76 K/UL — LOW (ref 150–400)
PLATELET # BLD AUTO: 86 K/UL — LOW (ref 150–400)
POTASSIUM SERPL-MCNC: 4.6 MMOL/L — SIGNIFICANT CHANGE UP (ref 3.5–5.3)
POTASSIUM SERPL-MCNC: 4.6 MMOL/L — SIGNIFICANT CHANGE UP (ref 3.5–5.3)
POTASSIUM SERPL-SCNC: 4.6 MMOL/L — SIGNIFICANT CHANGE UP (ref 3.5–5.3)
POTASSIUM SERPL-SCNC: 4.6 MMOL/L — SIGNIFICANT CHANGE UP (ref 3.5–5.3)
PROT SERPL-MCNC: 5.9 G/DL — LOW (ref 6–8.3)
PROT SERPL-MCNC: 6 G/DL — SIGNIFICANT CHANGE UP (ref 6–8.3)
PROTHROM AB SERPL-ACNC: 11.7 SEC — SIGNIFICANT CHANGE UP (ref 10.6–13.6)
PROTHROM AB SERPL-ACNC: 12.1 SEC — SIGNIFICANT CHANGE UP (ref 10.6–13.6)
RBC # BLD: 3.89 M/UL — SIGNIFICANT CHANGE UP (ref 3.8–5.2)
RBC # BLD: 4.01 M/UL — SIGNIFICANT CHANGE UP (ref 3.8–5.2)
RBC # FLD: 14.5 % — SIGNIFICANT CHANGE UP (ref 10.3–14.5)
RBC # FLD: 14.6 % — HIGH (ref 10.3–14.5)
SODIUM SERPL-SCNC: 134 MMOL/L — LOW (ref 135–145)
SODIUM SERPL-SCNC: 136 MMOL/L — SIGNIFICANT CHANGE UP (ref 135–145)
URATE SERPL-MCNC: 5.9 MG/DL — SIGNIFICANT CHANGE UP (ref 2.5–7)
URATE SERPL-MCNC: 5.9 MG/DL — SIGNIFICANT CHANGE UP (ref 2.5–7)
WBC # BLD: 7.21 K/UL — SIGNIFICANT CHANGE UP (ref 3.8–10.5)
WBC # BLD: 7.72 K/UL — SIGNIFICANT CHANGE UP (ref 3.8–10.5)
WBC # FLD AUTO: 7.21 K/UL — SIGNIFICANT CHANGE UP (ref 3.8–10.5)
WBC # FLD AUTO: 7.72 K/UL — SIGNIFICANT CHANGE UP (ref 3.8–10.5)

## 2021-05-21 PROCEDURE — 88307 TISSUE EXAM BY PATHOLOGIST: CPT | Mod: 26

## 2021-05-21 PROCEDURE — 99231 SBSQ HOSP IP/OBS SF/LOW 25: CPT

## 2021-05-21 PROCEDURE — 93970 EXTREMITY STUDY: CPT | Mod: 26

## 2021-05-21 RX ORDER — SODIUM CHLORIDE 9 MG/ML
1000 INJECTION, SOLUTION INTRAVENOUS
Refills: 0 | Status: DISCONTINUED | OUTPATIENT
Start: 2021-05-21 | End: 2021-05-25

## 2021-05-21 RX ORDER — ONDANSETRON 8 MG/1
4 TABLET, FILM COATED ORAL EVERY 6 HOURS
Refills: 0 | Status: DISCONTINUED | OUTPATIENT
Start: 2021-05-21 | End: 2021-05-22

## 2021-05-21 RX ORDER — DEXAMETHASONE 0.5 MG/5ML
4 ELIXIR ORAL EVERY 6 HOURS
Refills: 0 | Status: DISCONTINUED | OUTPATIENT
Start: 2021-05-21 | End: 2021-05-22

## 2021-05-21 RX ORDER — DIPHENHYDRAMINE HCL 50 MG
25 CAPSULE ORAL EVERY 4 HOURS
Refills: 0 | Status: DISCONTINUED | OUTPATIENT
Start: 2021-05-21 | End: 2021-05-22

## 2021-05-21 RX ORDER — NIFEDIPINE 30 MG
10 TABLET, EXTENDED RELEASE 24 HR ORAL ONCE
Refills: 0 | Status: COMPLETED | OUTPATIENT
Start: 2021-05-21 | End: 2021-05-21

## 2021-05-21 RX ORDER — OXYCODONE HYDROCHLORIDE 5 MG/1
5 TABLET ORAL
Refills: 0 | Status: DISCONTINUED | OUTPATIENT
Start: 2021-05-21 | End: 2021-05-22

## 2021-05-21 RX ORDER — MORPHINE SULFATE 50 MG/1
0.1 CAPSULE, EXTENDED RELEASE ORAL ONCE
Refills: 0 | Status: DISCONTINUED | OUTPATIENT
Start: 2021-05-21 | End: 2021-05-22

## 2021-05-21 RX ORDER — MAGNESIUM SULFATE 500 MG/ML
2 VIAL (ML) INJECTION
Qty: 40 | Refills: 0 | Status: DISCONTINUED | OUTPATIENT
Start: 2021-05-21 | End: 2021-05-22

## 2021-05-21 RX ORDER — MAGNESIUM SULFATE 500 MG/ML
4 VIAL (ML) INJECTION ONCE
Refills: 0 | Status: COMPLETED | OUTPATIENT
Start: 2021-05-21 | End: 2021-05-21

## 2021-05-21 RX ORDER — NALOXONE HYDROCHLORIDE 4 MG/.1ML
0.1 SPRAY NASAL
Refills: 0 | Status: DISCONTINUED | OUTPATIENT
Start: 2021-05-21 | End: 2021-05-22

## 2021-05-21 RX ORDER — NALBUPHINE HYDROCHLORIDE 10 MG/ML
2.5 INJECTION, SOLUTION INTRAMUSCULAR; INTRAVENOUS; SUBCUTANEOUS EVERY 6 HOURS
Refills: 0 | Status: DISCONTINUED | OUTPATIENT
Start: 2021-05-21 | End: 2021-05-22

## 2021-05-21 RX ADMIN — Medication 30 MILLIGRAM(S): at 05:55

## 2021-05-21 RX ADMIN — Medication 10 MILLIGRAM(S): at 20:29

## 2021-05-21 RX ADMIN — Medication 1 TABLET(S): at 11:42

## 2021-05-21 RX ADMIN — HEPARIN SODIUM 5000 UNIT(S): 5000 INJECTION INTRAVENOUS; SUBCUTANEOUS at 05:55

## 2021-05-21 RX ADMIN — Medication 81 MILLIGRAM(S): at 11:42

## 2021-05-21 RX ADMIN — Medication 50 GM/HR: at 20:34

## 2021-05-21 RX ADMIN — FAMOTIDINE 20 MILLIGRAM(S): 10 INJECTION INTRAVENOUS at 11:43

## 2021-05-21 RX ADMIN — Medication 200 GRAM(S): at 20:14

## 2021-05-21 RX ADMIN — SIMETHICONE 80 MILLIGRAM(S): 80 TABLET, CHEWABLE ORAL at 11:42

## 2021-05-21 RX ADMIN — HEPARIN SODIUM 5000 UNIT(S): 5000 INJECTION INTRAVENOUS; SUBCUTANEOUS at 13:44

## 2021-05-21 RX ADMIN — Medication 60 MILLIGRAM(S): at 18:32

## 2021-05-21 RX ADMIN — Medication 1 MILLIGRAM(S): at 11:43

## 2021-05-21 NOTE — CHART NOTE - NSCHARTNOTEFT_GEN_A_CORE
Pt was evaluated at bedside for BPP. Pt feels well without complaints. Denies HA, changes in vision, CP, SOB, palpitations, abd pain.    TAUS(5/21): Vtx, post placenta, AMAURY 11.9, BPP 8/8    Msantandreu pgy3

## 2021-05-21 NOTE — OB RN INTRAOPERATIVE NOTE - NSSELHIDDEN_OBGYN_ALL_OB_FT
[[email or web address]_ALL_OB_FT:[id number]==],[NS_DeliveryRN_OBGYN_ALL_OB_FT:[id number]]

## 2021-05-21 NOTE — CHART NOTE - NSCHARTNOTEFT_GEN_A_CORE
C Attending    37yo  at 31.3w admitted with siPEC by severe range BPs and worsening proteinuria. Recently, antihypertensive medication increased due to needing IV push medications (labetalol ) and Procardia changed to 30AM/60PM.   This AM, platelets noted to be 86 (currently 76 on repeat), from 134 yesterday.     Due to worsening blood pressure as well as rapidly decreasing platelets, concern for worsening maternal disease. Patient s/p BMZ -5/15. Per decision with MFM, recommend proceeding with delivery.  EFW 1509 (26%) from .   Mg to be initiated for seizure ppx and neuroprotection    Anesthesia aware    Raoul ALCAZAR

## 2021-05-21 NOTE — PROGRESS NOTE ADULT - ASSESSMENT
A/P: Pt is a 37yo  at 31.3w admitted with siPEC by severe range BPs and worsening proteinuria. Patient previously tachy to 110s-120s. Tachycardia improved. BP mildly elevated yesterday, procardia increased to 60. Patient had a headache yesterday and was found ot hve severe range BP. She was pushed Lab 20/40 and Procardia increased to 30/60. She was brought to L&D where she was monitored. Fetal status reassuring and maternal BP improved.     #siPEC  - s/p Mag x 24 hours for seizure ppx  - s/p Lab 20 IVP (5/15)   - c/w Procardia XL 60 QD  - TTE (5/15): EF 75%, hyperdynamic left ventricle   - Cardio-OB recs to increase BP meds if persistent BP IN 150s/90s  - s/p BMZ (-5/15) for FLM  - HELLP () Plts 135->134, f/u AM HELLP Labs    #Maternal tachycardia  - Tachycardia improved  - TSH: 5.54  - Free T4: 0.9  - Continuos tele/pulse ox   - Bedside sono neg for B lines    #Fetal well being  - NST BID   - s/p BMZ (-5/15) for FLM   - Appreciate NICU consult   - BPP 2x/week  - ATU Sono (): Vtx, post placenta, AMAURY 10, PARSON 8/8 wt 1509g (26%)  - GBS Neg ()    #Maternal well being  - Reg diet  - HSQ for DVT ppx  - PNV, folic acid, ASA   - Pepcid QD, TUms  -Monitor Epigastric pain    #MOD  - Prior C/S x2  - MOD: Repeat C/S at 34 weeks or sooner PRN maternal or fetal concerns  - BCM: desires Depo, declines tubal      Msantandreu PGY3   A/P: Pt is a 35yo  at 31.3w admitted with siPEC by severe range BPs and worsening proteinuria. Patient previously tachy to 110s-120s. Tachycardia improved. BP mildly elevated yesterday, procardia increased to 60. Patient had a headache yesterday and was found ot hve severe range BP. She was pushed Lab 20/40 and Procardia increased to 30/60. She was brought to L&D where she was monitored. Fetal status reassuring and maternal BP improved.     #siPEC  - s/p Mag x 24 hours for seizure ppx  - s/p Lab 20 IVP (5/15)   - c/w Procardia XL 60 QD  - TTE (5/15): EF 75%, hyperdynamic left ventricle   - Cardio-OB recs to increase BP meds if persistent BP IN 150s/90s  - s/p BMZ (-5/15) for FLM  - HELLP () Plts 135->134, f/u AM HELLP Labs    #Maternal tachycardia  - Tachycardia improved  - TSH: 5.54  - Free T4: 0.9  - Continuos tele/pulse ox   - Bedside sono neg for B lines    #Fetal well being  - NST BID   - s/p BMZ (-5/15) for FLM   - Appreciate NICU consult   - BPP 2x/week  - ATU Sono (): Vtx, post placenta, AMAURY 10, PARSON 8/8 wt 1509g (26%)  - GBS Neg ()    #Maternal well being  - Reg diet  - HSQ for DVT ppx  - PNV, folic acid, ASA   - Pepcid QD, TUms  -Monitor Epigastric pain  -RLE Doppler for R. Calf pain    #MOD  - Prior C/S x2  - MOD: Repeat C/S at 34 weeks or sooner PRN maternal or fetal concerns  - BCM: desires Depo, declines tubal      Msantandreu PGY3   A/P: Pt is a 37yo  at 31.3w admitted with siPEC by severe range BPs and worsening proteinuria. Patient previously tachy to 110s-120s. Tachycardia improved. BP mildly elevated yesterday, procardia increased to 60. Patient had a headache yesterday and was found ot hve severe range BP. She was pushed Lab 20/40 and Procardia increased to 30/60. She was brought to L&D where she was monitored. Fetal status reassuring and maternal BP improved.     #siPEC  - s/p Mag x 24 hours for seizure ppx  - s/p Lab 20 IVP (5/15)   - c/w Procardia XL 60 QD  - TTE (5/15): EF 75%, hyperdynamic left ventricle   - Cardio-OB recs to increase BP meds if persistent BP IN 150s/90s  - s/p BMZ (-5/15) for FLM  - HELLP () Plts 135->134, f/u AM HELLP Labs    #Maternal tachycardia  - Tachycardia improved  - TSH: 5.54  - Free T4: 0.9  - Continuos tele/pulse ox   - Bedside sono neg for B lines    #Fetal well being  - NST BID   - s/p BMZ (-5/15) for FLM   - Appreciate NICU consult   - BPP 2x/week  - ATU Sono (): Vtx, post placenta, AMAURY 10, PARSON 8/8 wt 1509g (26%)  - GBS Neg ()    #Maternal well being  - Reg diet  - HSQ for DVT ppx  - PNV, folic acid, ASA   - Pepcid QD, TUms  -Monitor Epigastric pain  -RLE Doppler for R. Calf pain    #MOD  - Prior C/S x2  - MOD: Repeat C/S at 34 weeks or sooner PRN maternal or fetal concerns  - BCM: desires Depo, declines tubal      Msantandreu PGY3    MFM Fellow Addendum  37 yo  at 31 weeks 3day gestation admitted with chronic hypertension with superimposed preeclampsia with severe features. Patient brought to L&D yesterday for severe range BPs, received IV Labetalol 20 and Labetalol 40. Anti-hypertensive regimen increased to Procardia 30 in AM 60 in PM. Patient is asymptomatic. FHR reactive with reassuring fetal status. Platelets stable. Plan to schedule CD at 34 weeks gestation. Patient seen and counseled with Dr. Beltran.   Vee Valle MD  Fellow, Maternal Fetal Medicine

## 2021-05-21 NOTE — OB NEONATOLOGY/PEDIATRICIAN DELIVERY SUMMARY - NSPEDSNEONOTESA_OBGYN_ALL_OB_FT
GONZALES: Baby is a 31.3 week GA F born to a 34 year-old  mother via CS (PEC). Prior to delivery, MOC noted to have thrombocytopenia; superimposed PEC with Mg+2 started at 20:15 on . Maternal blood type A(-) s/p Rhogam at 28wga. BMZ x2 (, 5/15). Maternal history: cHTN (Procardia 60mg QD); previous CS at 34wga; fetal demise at 29wga. Pregnancy complicated by cHTN, PEC. Pre- labs: negative/non-reactive/immune. GBS negative on . AROM at delivery with clear fluids. Baby born vigorous and crying. Warmed, dried, stimulated. Pulse oximeter showed age-appropriate O2 saturations, no CPAP in  APGARs 9/9. Breastfeeding. Consents to Hepatitis B vaccine.

## 2021-05-21 NOTE — OB RN DELIVERY SUMMARY - NSSELHIDDEN_OBGYN_ALL_OB_FT
[NS_DeliveryAttending1_OBGYN_ALL_OB_FT:[id number]==],[NS_DeliveryRN_OBGYN_ALL_OB_FT:[id number]]

## 2021-05-21 NOTE — PROGRESS NOTE ADULT - SUBJECTIVE AND OBJECTIVE BOX
R3 Progress note: HD# 8    Patient seen and examined at bedside, no acute overnight events. Headache improved. Epigastric pain improved. Pt complains of RLE calf pain started last night. No swelling.     Pt reports +FM. Denies LOF, VB, ctx, HA, epigastric pain, blurred vision, CP, SOB, N/V, fevers, and chills.    Vital Signs Last 24 Hours  T(C): 37 (05-21-21 @ 05:06), Max: 37.3 (05-20-21 @ 16:10)  HR: 105 (05-21-21 @ 05:06) (101 - 126)  BP: 143/80 (05-21-21 @ 05:06) (119/69 - 173/110)  RR: 18 (05-21-21 @ 05:06) (16 - 18)  SpO2: 105% (05-21-21 @ 05:06) (94% - 105%)    CAPILLARY BLOOD GLUCOSE      Physical Exam:  General: NAD  Abdomen: Soft, non-tender, gravid  Ext: No LE edema, erythema, pain    Labs:             11.7   9.02  )-----------( 134      ( 05-20 @ 15:32 )             34.7     05-20 @ 15:32    133  |  101  |  14  ----------------------------<  96  4.6   |  17  |  0.51    Ca    10.6      05-20 @ 15:32    TPro  6.2  /  Alb  3.3  /  TBili  0.3  /  DBili  x   /  AST  34  /  ALT  31  /  AlkPhos  90  05-20 @ 15:32    PT/INR - ( 05-20 @ 15:32 )   PT: 11.3 sec;   INR: 0.94 ratio    PTT - ( 05-20 @ 15:32 )  PTT:23.4 sec    Uric Acid: (05-20 @ 15:32)  5.1      Fibrinogen: (05-20 @ 15:32)  886      LDH: (05-20 @ 15:32)  328        MEDICATIONS  (STANDING):  aspirin enteric coated 81 milliGRAM(s) Oral daily  famotidine    Tablet 20 milliGRAM(s) Oral daily  folic acid 1 milliGRAM(s) Oral daily  heparin   Injectable 5000 Unit(s) SubCutaneous every 8 hours  NIFEdipine XL 30 milliGRAM(s) Oral every 24 hours  NIFEdipine XL 60 milliGRAM(s) Oral at bedtime  prenatal multivitamin 1 Tablet(s) Oral daily  simethicone 80 milliGRAM(s) Chew daily    MEDICATIONS  (PRN):  calcium carbonate    500 mG (Tums) Chewable 1 Tablet(s) Chew every 6 hours PRN Heartburn  famotidine    Tablet 20 milliGRAM(s) Oral daily PRN reflux, indigestion  sodium chloride 0.65% Nasal 1 Spray(s) Both Nostrils four times a day PRN Nasal Congestion

## 2021-05-21 NOTE — CHART NOTE - NSCHARTNOTEFT_GEN_A_CORE
Pt also had severe range /101, pt's BP was repeated 5 minutes after. Provider notified 30 minutes after Severe range BP. Asked nursing to check BP Immediately. BP was 140s/80s. Labs reviewed this afternoon. Plts found to be 86. No providers notified. about results. STAT labs drawn. Plts down trending to 76. Will bring to L&D for delivery.     [facility] pgy3

## 2021-05-22 LAB
ALBUMIN SERPL ELPH-MCNC: 2.7 G/DL — LOW (ref 3.3–5)
ALBUMIN SERPL ELPH-MCNC: 2.8 G/DL — LOW (ref 3.3–5)
ALBUMIN SERPL ELPH-MCNC: 2.8 G/DL — LOW (ref 3.3–5)
ALBUMIN SERPL ELPH-MCNC: 2.9 G/DL — LOW (ref 3.3–5)
ALP SERPL-CCNC: 72 U/L — SIGNIFICANT CHANGE UP (ref 40–120)
ALP SERPL-CCNC: 72 U/L — SIGNIFICANT CHANGE UP (ref 40–120)
ALP SERPL-CCNC: 83 U/L — SIGNIFICANT CHANGE UP (ref 40–120)
ALP SERPL-CCNC: 88 U/L — SIGNIFICANT CHANGE UP (ref 40–120)
ALT FLD-CCNC: 46 U/L — HIGH (ref 10–45)
ALT FLD-CCNC: 53 U/L — HIGH (ref 10–45)
ALT FLD-CCNC: 65 U/L — HIGH (ref 10–45)
ALT FLD-CCNC: 79 U/L — HIGH (ref 10–45)
ANION GAP SERPL CALC-SCNC: 13 MMOL/L — SIGNIFICANT CHANGE UP (ref 5–17)
ANION GAP SERPL CALC-SCNC: 14 MMOL/L — SIGNIFICANT CHANGE UP (ref 5–17)
ANION GAP SERPL CALC-SCNC: 14 MMOL/L — SIGNIFICANT CHANGE UP (ref 5–17)
ANION GAP SERPL CALC-SCNC: 15 MMOL/L — SIGNIFICANT CHANGE UP (ref 5–17)
APTT BLD: 23 SEC — LOW (ref 27.5–35.5)
APTT BLD: 25.7 SEC — LOW (ref 27.5–35.5)
APTT BLD: 27.3 SEC — LOW (ref 27.5–35.5)
APTT BLD: 29.3 SEC — SIGNIFICANT CHANGE UP (ref 27.5–35.5)
AST SERPL-CCNC: 39 U/L — SIGNIFICANT CHANGE UP (ref 10–40)
AST SERPL-CCNC: 48 U/L — HIGH (ref 10–40)
AST SERPL-CCNC: 57 U/L — HIGH (ref 10–40)
AST SERPL-CCNC: 73 U/L — HIGH (ref 10–40)
BASOPHILS # BLD AUTO: 0.01 K/UL — SIGNIFICANT CHANGE UP (ref 0–0.2)
BASOPHILS # BLD AUTO: 0.01 K/UL — SIGNIFICANT CHANGE UP (ref 0–0.2)
BASOPHILS NFR BLD AUTO: 0.1 % — SIGNIFICANT CHANGE UP (ref 0–2)
BASOPHILS NFR BLD AUTO: 0.2 % — SIGNIFICANT CHANGE UP (ref 0–2)
BILIRUB SERPL-MCNC: 0.2 MG/DL — SIGNIFICANT CHANGE UP (ref 0.2–1.2)
BUN SERPL-MCNC: 16 MG/DL — SIGNIFICANT CHANGE UP (ref 7–23)
BUN SERPL-MCNC: 16 MG/DL — SIGNIFICANT CHANGE UP (ref 7–23)
BUN SERPL-MCNC: 17 MG/DL — SIGNIFICANT CHANGE UP (ref 7–23)
BUN SERPL-MCNC: 17 MG/DL — SIGNIFICANT CHANGE UP (ref 7–23)
CALCIUM SERPL-MCNC: 7.8 MG/DL — LOW (ref 8.4–10.5)
CALCIUM SERPL-MCNC: 8.3 MG/DL — LOW (ref 8.4–10.5)
CALCIUM SERPL-MCNC: 9 MG/DL — SIGNIFICANT CHANGE UP (ref 8.4–10.5)
CALCIUM SERPL-MCNC: 9.8 MG/DL — SIGNIFICANT CHANGE UP (ref 8.4–10.5)
CHLORIDE SERPL-SCNC: 101 MMOL/L — SIGNIFICANT CHANGE UP (ref 96–108)
CHLORIDE SERPL-SCNC: 96 MMOL/L — SIGNIFICANT CHANGE UP (ref 96–108)
CHLORIDE SERPL-SCNC: 96 MMOL/L — SIGNIFICANT CHANGE UP (ref 96–108)
CHLORIDE SERPL-SCNC: 98 MMOL/L — SIGNIFICANT CHANGE UP (ref 96–108)
CO2 SERPL-SCNC: 17 MMOL/L — LOW (ref 22–31)
CO2 SERPL-SCNC: 17 MMOL/L — LOW (ref 22–31)
CO2 SERPL-SCNC: 18 MMOL/L — LOW (ref 22–31)
CO2 SERPL-SCNC: 19 MMOL/L — LOW (ref 22–31)
CREAT SERPL-MCNC: 0.56 MG/DL — SIGNIFICANT CHANGE UP (ref 0.5–1.3)
CREAT SERPL-MCNC: 0.58 MG/DL — SIGNIFICANT CHANGE UP (ref 0.5–1.3)
CREAT SERPL-MCNC: 0.61 MG/DL — SIGNIFICANT CHANGE UP (ref 0.5–1.3)
CREAT SERPL-MCNC: 0.62 MG/DL — SIGNIFICANT CHANGE UP (ref 0.5–1.3)
EOSINOPHIL # BLD AUTO: 0 K/UL — SIGNIFICANT CHANGE UP (ref 0–0.5)
EOSINOPHIL # BLD AUTO: 0.05 K/UL — SIGNIFICANT CHANGE UP (ref 0–0.5)
EOSINOPHIL NFR BLD AUTO: 0 % — SIGNIFICANT CHANGE UP (ref 0–6)
EOSINOPHIL NFR BLD AUTO: 0.8 % — SIGNIFICANT CHANGE UP (ref 0–6)
FIBRINOGEN PPP-MCNC: 1010 MG/DL — HIGH (ref 290–520)
FIBRINOGEN PPP-MCNC: 1072 MG/DL — HIGH (ref 290–520)
FIBRINOGEN PPP-MCNC: 892 MG/DL — HIGH (ref 290–520)
FIBRINOGEN PPP-MCNC: 956 MG/DL — HIGH (ref 290–520)
GLUCOSE SERPL-MCNC: 104 MG/DL — HIGH (ref 70–99)
GLUCOSE SERPL-MCNC: 113 MG/DL — HIGH (ref 70–99)
GLUCOSE SERPL-MCNC: 87 MG/DL — SIGNIFICANT CHANGE UP (ref 70–99)
GLUCOSE SERPL-MCNC: 97 MG/DL — SIGNIFICANT CHANGE UP (ref 70–99)
HCT VFR BLD CALC: 26.4 % — LOW (ref 34.5–45)
HCT VFR BLD CALC: 27.6 % — LOW (ref 34.5–45)
HCT VFR BLD CALC: 31.7 % — LOW (ref 34.5–45)
HCT VFR BLD CALC: 32.5 % — LOW (ref 34.5–45)
HEPARIN-PF4 AB RESULT: SIGNIFICANT CHANGE UP U/ML (ref 0–0.9)
HGB BLD-MCNC: 10.4 G/DL — LOW (ref 11.5–15.5)
HGB BLD-MCNC: 10.9 G/DL — LOW (ref 11.5–15.5)
HGB BLD-MCNC: 8.8 G/DL — LOW (ref 11.5–15.5)
HGB BLD-MCNC: 9.2 G/DL — LOW (ref 11.5–15.5)
IMM GRANULOCYTES NFR BLD AUTO: 0.8 % — SIGNIFICANT CHANGE UP (ref 0–1.5)
IMM GRANULOCYTES NFR BLD AUTO: 1.2 % — SIGNIFICANT CHANGE UP (ref 0–1.5)
INR BLD: 0.8 RATIO — LOW (ref 0.88–1.16)
INR BLD: 0.86 RATIO — LOW (ref 0.88–1.16)
INR BLD: 0.88 RATIO — SIGNIFICANT CHANGE UP (ref 0.88–1.16)
INR BLD: 0.88 RATIO — SIGNIFICANT CHANGE UP (ref 0.88–1.16)
KLEIHAUER-BETKE CALCULATION: 0 % — SIGNIFICANT CHANGE UP (ref 0–0.3)
KLEIHAUER-BETKE CALCULATION: 0 % — SIGNIFICANT CHANGE UP (ref 0–0.3)
LDH SERPL L TO P-CCNC: 242 U/L — SIGNIFICANT CHANGE UP (ref 50–242)
LDH SERPL L TO P-CCNC: 260 U/L — HIGH (ref 50–242)
LDH SERPL L TO P-CCNC: 263 U/L — HIGH (ref 50–242)
LDH SERPL L TO P-CCNC: 267 U/L — HIGH (ref 50–242)
LYMPHOCYTES # BLD AUTO: 1.69 K/UL — SIGNIFICANT CHANGE UP (ref 1–3.3)
LYMPHOCYTES # BLD AUTO: 18.5 % — SIGNIFICANT CHANGE UP (ref 13–44)
LYMPHOCYTES # BLD AUTO: 2.13 K/UL — SIGNIFICANT CHANGE UP (ref 1–3.3)
LYMPHOCYTES # BLD AUTO: 26.2 % — SIGNIFICANT CHANGE UP (ref 13–44)
MAGNESIUM SERPL-MCNC: 5.1 MG/DL — HIGH (ref 1.6–2.6)
MAGNESIUM SERPL-MCNC: 6.2 MG/DL — HIGH (ref 1.6–2.6)
MAGNESIUM SERPL-MCNC: 6.9 MG/DL — HIGH (ref 1.6–2.6)
MAGNESIUM SERPL-MCNC: 7 MG/DL — CRITICAL HIGH (ref 1.6–2.6)
MCHC RBC-ENTMCNC: 27.4 PG — SIGNIFICANT CHANGE UP (ref 27–34)
MCHC RBC-ENTMCNC: 27.8 PG — SIGNIFICANT CHANGE UP (ref 27–34)
MCHC RBC-ENTMCNC: 27.9 PG — SIGNIFICANT CHANGE UP (ref 27–34)
MCHC RBC-ENTMCNC: 28.2 PG — SIGNIFICANT CHANGE UP (ref 27–34)
MCHC RBC-ENTMCNC: 32.8 GM/DL — SIGNIFICANT CHANGE UP (ref 32–36)
MCHC RBC-ENTMCNC: 33.3 GM/DL — SIGNIFICANT CHANGE UP (ref 32–36)
MCHC RBC-ENTMCNC: 33.3 GM/DL — SIGNIFICANT CHANGE UP (ref 32–36)
MCHC RBC-ENTMCNC: 33.5 GM/DL — SIGNIFICANT CHANGE UP (ref 32–36)
MCV RBC AUTO: 83.4 FL — SIGNIFICANT CHANGE UP (ref 80–100)
MCV RBC AUTO: 83.5 FL — SIGNIFICANT CHANGE UP (ref 80–100)
MCV RBC AUTO: 83.6 FL — SIGNIFICANT CHANGE UP (ref 80–100)
MCV RBC AUTO: 84 FL — SIGNIFICANT CHANGE UP (ref 80–100)
MONOCYTES # BLD AUTO: 0.22 K/UL — SIGNIFICANT CHANGE UP (ref 0–0.9)
MONOCYTES # BLD AUTO: 0.85 K/UL — SIGNIFICANT CHANGE UP (ref 0–0.9)
MONOCYTES NFR BLD AUTO: 3.4 % — SIGNIFICANT CHANGE UP (ref 2–14)
MONOCYTES NFR BLD AUTO: 7.4 % — SIGNIFICANT CHANGE UP (ref 2–14)
NEUTROPHILS # BLD AUTO: 4.39 K/UL — SIGNIFICANT CHANGE UP (ref 1.8–7.4)
NEUTROPHILS # BLD AUTO: 8.42 K/UL — HIGH (ref 1.8–7.4)
NEUTROPHILS NFR BLD AUTO: 68.2 % — SIGNIFICANT CHANGE UP (ref 43–77)
NEUTROPHILS NFR BLD AUTO: 73.2 % — SIGNIFICANT CHANGE UP (ref 43–77)
NRBC # BLD: 0 /100 WBCS — SIGNIFICANT CHANGE UP (ref 0–0)
PF4 HEPARIN CMPLX AB SER-ACNC: NEGATIVE — SIGNIFICANT CHANGE UP
PLATELET # BLD AUTO: 66 K/UL — LOW (ref 150–400)
PLATELET # BLD AUTO: 68 K/UL — LOW (ref 150–400)
PLATELET # BLD AUTO: 69 K/UL — LOW (ref 150–400)
PLATELET # BLD AUTO: 77 K/UL — LOW (ref 150–400)
POTASSIUM SERPL-MCNC: 4.4 MMOL/L — SIGNIFICANT CHANGE UP (ref 3.5–5.3)
POTASSIUM SERPL-MCNC: 4.7 MMOL/L — SIGNIFICANT CHANGE UP (ref 3.5–5.3)
POTASSIUM SERPL-MCNC: 5 MMOL/L — SIGNIFICANT CHANGE UP (ref 3.5–5.3)
POTASSIUM SERPL-MCNC: 5.2 MMOL/L — SIGNIFICANT CHANGE UP (ref 3.5–5.3)
POTASSIUM SERPL-SCNC: 4.4 MMOL/L — SIGNIFICANT CHANGE UP (ref 3.5–5.3)
POTASSIUM SERPL-SCNC: 4.7 MMOL/L — SIGNIFICANT CHANGE UP (ref 3.5–5.3)
POTASSIUM SERPL-SCNC: 5 MMOL/L — SIGNIFICANT CHANGE UP (ref 3.5–5.3)
POTASSIUM SERPL-SCNC: 5.2 MMOL/L — SIGNIFICANT CHANGE UP (ref 3.5–5.3)
PROT SERPL-MCNC: 5.3 G/DL — LOW (ref 6–8.3)
PROT SERPL-MCNC: 5.5 G/DL — LOW (ref 6–8.3)
PROT SERPL-MCNC: 5.7 G/DL — LOW (ref 6–8.3)
PROT SERPL-MCNC: 5.9 G/DL — LOW (ref 6–8.3)
PROTHROM AB SERPL-ACNC: 10.4 SEC — LOW (ref 10.6–13.6)
PROTHROM AB SERPL-ACNC: 10.6 SEC — SIGNIFICANT CHANGE UP (ref 10.6–13.6)
PROTHROM AB SERPL-ACNC: 10.6 SEC — SIGNIFICANT CHANGE UP (ref 10.6–13.6)
PROTHROM AB SERPL-ACNC: 9.7 SEC — LOW (ref 10.6–13.6)
RBC # BLD: 3.16 M/UL — LOW (ref 3.8–5.2)
RBC # BLD: 3.3 M/UL — LOW (ref 3.8–5.2)
RBC # BLD: 3.8 M/UL — SIGNIFICANT CHANGE UP (ref 3.8–5.2)
RBC # BLD: 3.87 M/UL — SIGNIFICANT CHANGE UP (ref 3.8–5.2)
RBC # FLD: 14.3 % — SIGNIFICANT CHANGE UP (ref 10.3–14.5)
RBC # FLD: 14.3 % — SIGNIFICANT CHANGE UP (ref 10.3–14.5)
RBC # FLD: 14.4 % — SIGNIFICANT CHANGE UP (ref 10.3–14.5)
RBC # FLD: 14.5 % — SIGNIFICANT CHANGE UP (ref 10.3–14.5)
SODIUM SERPL-SCNC: 128 MMOL/L — LOW (ref 135–145)
SODIUM SERPL-SCNC: 128 MMOL/L — LOW (ref 135–145)
SODIUM SERPL-SCNC: 130 MMOL/L — LOW (ref 135–145)
SODIUM SERPL-SCNC: 132 MMOL/L — LOW (ref 135–145)
TSH SERPL-MCNC: 2.15 UIU/ML — SIGNIFICANT CHANGE UP (ref 0.27–4.2)
URATE SERPL-MCNC: 6.5 MG/DL — SIGNIFICANT CHANGE UP (ref 2.5–7)
URATE SERPL-MCNC: 6.7 MG/DL — SIGNIFICANT CHANGE UP (ref 2.5–7)
URATE SERPL-MCNC: 7 MG/DL — SIGNIFICANT CHANGE UP (ref 2.5–7)
WBC # BLD: 11.5 K/UL — HIGH (ref 3.8–10.5)
WBC # BLD: 12.35 K/UL — HIGH (ref 3.8–10.5)
WBC # BLD: 12.68 K/UL — HIGH (ref 3.8–10.5)
WBC # BLD: 6.44 K/UL — SIGNIFICANT CHANGE UP (ref 3.8–10.5)
WBC # FLD AUTO: 11.5 K/UL — HIGH (ref 3.8–10.5)
WBC # FLD AUTO: 12.35 K/UL — HIGH (ref 3.8–10.5)
WBC # FLD AUTO: 12.68 K/UL — HIGH (ref 3.8–10.5)
WBC # FLD AUTO: 6.44 K/UL — SIGNIFICANT CHANGE UP (ref 3.8–10.5)

## 2021-05-22 RX ORDER — DIPHENHYDRAMINE HCL 50 MG
25 CAPSULE ORAL EVERY 6 HOURS
Refills: 0 | Status: DISCONTINUED | OUTPATIENT
Start: 2021-05-22 | End: 2021-05-25

## 2021-05-22 RX ORDER — LANOLIN
1 OINTMENT (GRAM) TOPICAL EVERY 6 HOURS
Refills: 0 | Status: DISCONTINUED | OUTPATIENT
Start: 2021-05-22 | End: 2021-05-25

## 2021-05-22 RX ORDER — HEPARIN SODIUM 5000 [USP'U]/ML
5000 INJECTION INTRAVENOUS; SUBCUTANEOUS EVERY 12 HOURS
Refills: 0 | Status: DISCONTINUED | OUTPATIENT
Start: 2021-05-22 | End: 2021-05-22

## 2021-05-22 RX ORDER — OXYCODONE HYDROCHLORIDE 5 MG/1
5 TABLET ORAL
Refills: 0 | Status: COMPLETED | OUTPATIENT
Start: 2021-05-22 | End: 2021-05-29

## 2021-05-22 RX ORDER — OXYCODONE HYDROCHLORIDE 5 MG/1
5 TABLET ORAL ONCE
Refills: 0 | Status: DISCONTINUED | OUTPATIENT
Start: 2021-05-22 | End: 2021-05-25

## 2021-05-22 RX ORDER — MAGNESIUM SULFATE 500 MG/ML
1.5 VIAL (ML) INJECTION
Qty: 40 | Refills: 0 | Status: DISCONTINUED | OUTPATIENT
Start: 2021-05-22 | End: 2021-05-25

## 2021-05-22 RX ORDER — FOLIC ACID 0.8 MG
1 TABLET ORAL DAILY
Refills: 0 | Status: DISCONTINUED | OUTPATIENT
Start: 2021-05-22 | End: 2021-05-25

## 2021-05-22 RX ORDER — TETANUS TOXOID, REDUCED DIPHTHERIA TOXOID AND ACELLULAR PERTUSSIS VACCINE, ADSORBED 5; 2.5; 8; 8; 2.5 [IU]/.5ML; [IU]/.5ML; UG/.5ML; UG/.5ML; UG/.5ML
0.5 SUSPENSION INTRAMUSCULAR ONCE
Refills: 0 | Status: DISCONTINUED | OUTPATIENT
Start: 2021-05-22 | End: 2021-05-25

## 2021-05-22 RX ORDER — IBUPROFEN 200 MG
600 TABLET ORAL EVERY 6 HOURS
Refills: 0 | Status: DISCONTINUED | OUTPATIENT
Start: 2021-05-22 | End: 2021-05-22

## 2021-05-22 RX ORDER — ENOXAPARIN SODIUM 100 MG/ML
40 INJECTION SUBCUTANEOUS DAILY
Refills: 0 | Status: DISCONTINUED | OUTPATIENT
Start: 2021-05-22 | End: 2021-05-25

## 2021-05-22 RX ORDER — SIMETHICONE 80 MG/1
80 TABLET, CHEWABLE ORAL EVERY 4 HOURS
Refills: 0 | Status: DISCONTINUED | OUTPATIENT
Start: 2021-05-22 | End: 2021-05-25

## 2021-05-22 RX ORDER — ACETAMINOPHEN 500 MG
975 TABLET ORAL
Refills: 0 | Status: DISCONTINUED | OUTPATIENT
Start: 2021-05-22 | End: 2021-05-25

## 2021-05-22 RX ORDER — OXYTOCIN 10 UNIT/ML
333.33 VIAL (ML) INJECTION
Qty: 20 | Refills: 0 | Status: DISCONTINUED | OUTPATIENT
Start: 2021-05-22 | End: 2021-05-25

## 2021-05-22 RX ORDER — MAGNESIUM HYDROXIDE 400 MG/1
30 TABLET, CHEWABLE ORAL
Refills: 0 | Status: DISCONTINUED | OUTPATIENT
Start: 2021-05-22 | End: 2021-05-25

## 2021-05-22 RX ORDER — SODIUM CHLORIDE 9 MG/ML
1000 INJECTION, SOLUTION INTRAVENOUS
Refills: 0 | Status: DISCONTINUED | OUTPATIENT
Start: 2021-05-22 | End: 2021-05-25

## 2021-05-22 RX ORDER — DIPHENOXYLATE HCL/ATROPINE 2.5-.025MG
2 TABLET ORAL ONCE
Refills: 0 | Status: DISCONTINUED | OUTPATIENT
Start: 2021-05-22 | End: 2021-05-22

## 2021-05-22 RX ADMIN — FAMOTIDINE 20 MILLIGRAM(S): 10 INJECTION INTRAVENOUS at 11:55

## 2021-05-22 RX ADMIN — Medication 975 MILLIGRAM(S): at 11:47

## 2021-05-22 RX ADMIN — Medication 975 MILLIGRAM(S): at 21:30

## 2021-05-22 RX ADMIN — Medication 1 MILLIGRAM(S): at 11:55

## 2021-05-22 RX ADMIN — Medication 60 MILLIGRAM(S): at 18:50

## 2021-05-22 RX ADMIN — Medication 975 MILLIGRAM(S): at 14:52

## 2021-05-22 RX ADMIN — Medication 1 TABLET(S): at 11:56

## 2021-05-22 RX ADMIN — ENOXAPARIN SODIUM 40 MILLIGRAM(S): 100 INJECTION SUBCUTANEOUS at 18:50

## 2021-05-22 RX ADMIN — Medication 975 MILLIGRAM(S): at 15:30

## 2021-05-22 RX ADMIN — Medication 975 MILLIGRAM(S): at 10:32

## 2021-05-22 RX ADMIN — Medication 2 TABLET(S): at 00:14

## 2021-05-22 RX ADMIN — Medication 975 MILLIGRAM(S): at 20:51

## 2021-05-22 RX ADMIN — SIMETHICONE 80 MILLIGRAM(S): 80 TABLET, CHEWABLE ORAL at 11:56

## 2021-05-22 RX ADMIN — Medication 30 MILLIGRAM(S): at 06:33

## 2021-05-22 NOTE — OB PROVIDER DELIVERY SUMMARY - NSSELHIDDEN_OBGYN_ALL_OB_FT
[[email or web address]_ALL_OB_FT:[id number]],[NS_DeliveryRN_OBGYN_ALL_OB_FT:[id number]]

## 2021-05-22 NOTE — OB PROVIDER DELIVERY SUMMARY - NSPROVIDERDELIVERYNOTE_OBGYN_ALL_OB_FT
rLTCS of liveborn female infant from cephalic position for worsening siPEC. Weight **, Apgars **. Dense omental adhesions to superior fascial incision. Grossly normal uterus, adnexa b/l. Mild R lateral bladder adhesions. Hysterotomy closed in 1 layer. Paola used. Hemabate x 1 used    IVF 1600   rLTCS of liveborn female infant from cephalic position for worsening siPEC. Dense omental adhesions to superior fascial incision. Grossly normal uterus, adnexa b/l. Mild R lateral bladder adhesions. Hysterotomy closed in 1 layer. Paola used. Hemabate x 1 used    IVF 1600   rLTCS of liveborn female infant at 31w3d from cephalic position for worsening siPEC (worsening thrombocytopenia, rising LFTs). Weight 1520g, Apgars 9/9. Dense omental adhesions to superior fascial incision. Grossly normal uterus, adnexa b/l. Mild filmy R lateral bladder adhesions. Hysterotomy closed in 1 layer. Paola used. Hemabate x 1 used    IVF 1600

## 2021-05-22 NOTE — PROGRESS NOTE ADULT - ASSESSMENT
A/P: 37yo POD#1 s/p LTCS at 31 week for siPEC with downtrending platelets. Patient Procardia 10 IR for persistent severe range BP prior to delivery. On Mag at this time. BPs controlled overnight on PO meds. Patient is stable and doing well post-operatively.    #siPEC  - c/w Mag x 24 hours for seizure ppx  - c/w Procardia XL 60 QD  - TTE (5/15): EF 75%, hyperdynamic left ventricle   - Cardio-OB recs to increase BP meds if persistent BP IN 150s/90s  - Platelets: 189->187->165->131->134->86->76->68   - AST/ALT: 34/31->>41/46->39/46    - c/w HELLP labs Q6H at this time  - Monitor BPs, symptoms closely    #Maternal tachycardia during antepartum course  - Tachycardia improved presently  - TSH: 5.54  - Free T4: 0.9  - Continuos tele/pulse ox   - Bedside sono neg for B lines    #Postpartum state  - Continue regular diet  - Increase ambulation  - Continue Tylenol, oxycodone PRN for pain control  - Avoid NSAIDs until platelets stabilize  - F/u AM CBC    BRITNEY Peterson PGY3 A/P: 35yo POD#1 s/p LTCS at 31 week for siPEC with downtrending platelets. Patient Procardia 10 IR for persistent severe range BP prior to delivery. On Mag at this time. BPs controlled overnight on PO meds. Patient is stable and doing well post-operatively.    #siPEC  - c/w Mag x 24 hours for seizure ppx  - c/w Procardia XL 60 QD  - TTE (5/15): EF 75%, hyperdynamic left ventricle   - Cardio-OB recs to increase BP meds if persistent BP IN 150s/90s  - Platelets: 189->187->165->131->134->86->76->68   - AST/ALT: 34/31->>41/46->39/46    - c/w HELLP labs Q6H at this time  - Monitor BPs, symptoms closely    #Maternal tachycardia during antepartum course  - Tachycardia improved presently  - TSH: 5.54  - Free T4: 0.9  - Continuos tele/pulse ox   - Bedside sono neg for B lines    #Postpartum state  - Continue regular diet  - Increase ambulation  - Continue Tylenol, oxycodone PRN for pain control  - Avoid NSAIDs until platelets stabilize  - F/u AM CBC    BRITNEY Peterson PGY3    Service attending addendum:   37 yo  on POD 1 s/p LTCD at 31 weeks for siPEC. Patient with downtrending plts, transaminitis and epigastric pain prior to delivery. Today, reports epigastric pain fully resolved and is feeling well overall. Mag gtt continues x 24 hrs post partum. Patient pumping without issues with good UOP. JANELL labs negative, and thrombocytopenia and other lab findings consistent with siPEC, especially with resolution of epigastric pain. Will continue to follow S&S, q 6 PEC labs.     SENDY Wyatt

## 2021-05-22 NOTE — PROGRESS NOTE ADULT - SUBJECTIVE AND OBJECTIVE BOX
OB Progress Note:  Delivery, POD#1    S: Her pain is well controlled. She is tolerating a regular diet but not yet passing flatus. Denies N/V. Denies CP/SOB/lightheadedness/dizziness. Not yet OOB. Gray in place.     O:   Vital Signs Last 24 Hrs  T(C): 36.6 (22 May 2021 03:40), Max: 37.1 (21 May 2021 09:40)  T(F): 97.9 (22 May 2021 03:40), Max: 98.8 (21 May 2021 09:40)  HR: 101 (22 May 2021 03:40) (90 - 124)  BP: 145/86 (22 May 2021 03:40) (121/65 - 167/96)  BP(mean): 87 (22 May 2021 02:20) (87 - 101)  RR: 18 (22 May 2021 03:40) (12 - 19)  SpO2: 98% (22 May 2021 03:40) (94% - 105%)    Labs:  Blood type: A Negative  Rubella IgG: RPR: Negative                          10.9<L>   6.44 >-----------< 68<L>    (  @ 00:55 )             32.5<L>                        10.9<L>   7.21 >-----------< 76<L>    (  @ 19:33 )             32.9<L>                        11.0<L>   7.72 >-----------< 86<L>    (  @ 07:28 )             33.6<L>                        11.7   9.02 >-----------< 134<L>    (  @ 15:32 )             34.7                        10.4<L>   8.62 >-----------< 131<L>    (  @ 07:01 )             31.4<L>                        10.6<L>   9.55 >-----------< 165    (  @ 17:14 )             31.9<L>    21 @ 00:56      132<L>  |  101  |  16  ----------------------------<  87  4.4   |  17<L>  |  0.58    21 @ 19:33      134<L>  |  102  |  17  ----------------------------<  78  4.6   |  19<L>  |  0.59    21 @ 07:28      136  |  102  |  15  ----------------------------<  64<L>  4.6   |  17<L>  |  0.55    21 @ 15:32      133<L>  |  101  |  14  ----------------------------<  96  4.6   |  17<L>  |  0.51    21 @ 06:59      134<L>  |  103  |  12  ----------------------------<  67<L>  4.1   |  17<L>  |  0.45<L>    21 @ 17:14      134<L>  |  102  |  14  ----------------------------<  80  4.5   |  17<L>  |  0.83        Ca    9.8      22 May 2021 00:56  Ca    10.5      21 May 2021 19:33  Ca    9.6      21 May 2021 07:28  Ca    10.6<H>      20 May 2021 15:32  Ca    9.0      20 May 2021 06:59  Ca    9.8      19 May 2021 17:14  Mg     5.1<H>         TPro  5.9<L>  /  Alb  2.9<L>  /  TBili  0.2  /  DBili  x   /  AST  39  /  ALT  46<H>  /  AlkPhos  88  21 @ 00:56  TPro  5.9<L>  /  Alb  2.9<L>  /  TBili  0.2  /  DBili  x   /  AST  41<H>  /  ALT  46<H>  /  AlkPhos  83  21 @ 19:33  TPro  6.0  /  Alb  3.1<L>  /  TBili  0.3  /  DBili  x   /  AST  36  /  ALT  37  /  AlkPhos  85  21 @ 07:28  TPro  6.2  /  Alb  3.3  /  TBili  0.3  /  DBili  x   /  AST  34  /  ALT  31  /  AlkPhos  90  21 @ 15:32  TPro  5.4<L>  /  Alb  2.9<L>  /  TBili  0.2  /  DBili  x   /  AST  23  /  ALT  22  /  AlkPhos  73  21 @ 06:59  TPro  6.1  /  Alb  3.3  /  TBili  0.2  /  DBili  x   /  AST  28  /  ALT  28  /  AlkPhos  79  21 @ 17:14          acetaminophen   Tablet .. 975 milliGRAM(s) Oral <User Schedule>  calcium carbonate    500 mG (Tums) Chewable 1 Tablet(s) Chew every 6 hours PRN  dexAMETHasone  Injectable 4 milliGRAM(s) IV Push every 6 hours PRN  diphenhydrAMINE 25 milliGRAM(s) Oral every 4 hours PRN  diphenhydrAMINE 25 milliGRAM(s) Oral every 6 hours PRN  diphtheria/tetanus/pertussis (acellular) Vaccine (ADAcel) 0.5 milliLiter(s) IntraMuscular once  famotidine    Tablet 20 milliGRAM(s) Oral daily PRN  famotidine    Tablet 20 milliGRAM(s) Oral daily  folic acid 1 milliGRAM(s) Oral daily  folic acid 1 milliGRAM(s) Oral daily  lactated ringers. 1000 milliLiter(s) IV Continuous <Continuous>  lactated ringers. 1000 milliLiter(s) IV Continuous <Continuous>  lanolin Ointment 1 Application(s) Topical every 6 hours PRN  magnesium hydroxide Suspension 30 milliLiter(s) Oral two times a day PRN  morphine PF Spinal 0.1 milliGRAM(s) IntraThecal. once  nalbuphine Injectable 2.5 milliGRAM(s) IV Push every 6 hours PRN  naloxone Injectable 0.1 milliGRAM(s) IV Push every 3 minutes PRN  NIFEdipine XL 30 milliGRAM(s) Oral every 24 hours  NIFEdipine XL 60 milliGRAM(s) Oral at bedtime  ondansetron Injectable 4 milliGRAM(s) IV Push every 6 hours PRN  oxyCODONE    IR 5 milliGRAM(s) Oral every 3 hours PRN  oxyCODONE    IR 5 milliGRAM(s) Oral once PRN  oxyCODONE    IR 5 milliGRAM(s) Oral every 3 hours PRN  oxytocin Infusion 333.333 milliUNIT(s)/Min IV Continuous <Continuous>  prenatal multivitamin 1 Tablet(s) Oral daily  prenatal multivitamin 1 Tablet(s) Oral daily  simethicone 80 milliGRAM(s) Chew daily  simethicone 80 milliGRAM(s) Chew every 4 hours PRN  sodium chloride 0.65% Nasal 1 Spray(s) Both Nostrils four times a day PRN      PE:  General: NAD  Abdomen: Mildly distended, appropriately tender, incision c/d/i.  Extremities: No erythema, no pitting edema

## 2021-05-23 LAB
ALBUMIN SERPL ELPH-MCNC: 2.6 G/DL — LOW (ref 3.3–5)
ALP SERPL-CCNC: 65 U/L — SIGNIFICANT CHANGE UP (ref 40–120)
ALT FLD-CCNC: 88 U/L — HIGH (ref 10–45)
ANION GAP SERPL CALC-SCNC: 12 MMOL/L — SIGNIFICANT CHANGE UP (ref 5–17)
APTT BLD: 30.2 SEC — SIGNIFICANT CHANGE UP (ref 27.5–35.5)
AST SERPL-CCNC: 65 U/L — HIGH (ref 10–40)
BASOPHILS # BLD AUTO: 0.01 K/UL — SIGNIFICANT CHANGE UP (ref 0–0.2)
BASOPHILS NFR BLD AUTO: 0.1 % — SIGNIFICANT CHANGE UP (ref 0–2)
BILIRUB SERPL-MCNC: 0.1 MG/DL — LOW (ref 0.2–1.2)
BUN SERPL-MCNC: 15 MG/DL — SIGNIFICANT CHANGE UP (ref 7–23)
CALCIUM SERPL-MCNC: 7.8 MG/DL — LOW (ref 8.4–10.5)
CHLORIDE SERPL-SCNC: 103 MMOL/L — SIGNIFICANT CHANGE UP (ref 96–108)
CO2 SERPL-SCNC: 20 MMOL/L — LOW (ref 22–31)
CREAT SERPL-MCNC: 0.57 MG/DL — SIGNIFICANT CHANGE UP (ref 0.5–1.3)
EOSINOPHIL # BLD AUTO: 0.01 K/UL — SIGNIFICANT CHANGE UP (ref 0–0.5)
EOSINOPHIL NFR BLD AUTO: 0.1 % — SIGNIFICANT CHANGE UP (ref 0–6)
FIBRINOGEN PPP-MCNC: 940 MG/DL — HIGH (ref 290–520)
GLUCOSE SERPL-MCNC: 71 MG/DL — SIGNIFICANT CHANGE UP (ref 70–99)
HCT VFR BLD CALC: 25.2 % — LOW (ref 34.5–45)
HGB BLD-MCNC: 8.3 G/DL — LOW (ref 11.5–15.5)
IMM GRANULOCYTES NFR BLD AUTO: 1.4 % — SIGNIFICANT CHANGE UP (ref 0–1.5)
INR BLD: 0.88 RATIO — SIGNIFICANT CHANGE UP (ref 0.88–1.16)
LDH SERPL L TO P-CCNC: 238 U/L — SIGNIFICANT CHANGE UP (ref 50–242)
LYMPHOCYTES # BLD AUTO: 2.7 K/UL — SIGNIFICANT CHANGE UP (ref 1–3.3)
LYMPHOCYTES # BLD AUTO: 28.8 % — SIGNIFICANT CHANGE UP (ref 13–44)
MCHC RBC-ENTMCNC: 28 PG — SIGNIFICANT CHANGE UP (ref 27–34)
MCHC RBC-ENTMCNC: 32.9 GM/DL — SIGNIFICANT CHANGE UP (ref 32–36)
MCV RBC AUTO: 85.1 FL — SIGNIFICANT CHANGE UP (ref 80–100)
MONOCYTES # BLD AUTO: 0.66 K/UL — SIGNIFICANT CHANGE UP (ref 0–0.9)
MONOCYTES NFR BLD AUTO: 7 % — SIGNIFICANT CHANGE UP (ref 2–14)
NEUTROPHILS # BLD AUTO: 5.86 K/UL — SIGNIFICANT CHANGE UP (ref 1.8–7.4)
NEUTROPHILS NFR BLD AUTO: 62.6 % — SIGNIFICANT CHANGE UP (ref 43–77)
NRBC # BLD: 0 /100 WBCS — SIGNIFICANT CHANGE UP (ref 0–0)
PLATELET # BLD AUTO: 90 K/UL — LOW (ref 150–400)
POTASSIUM SERPL-MCNC: 4.1 MMOL/L — SIGNIFICANT CHANGE UP (ref 3.5–5.3)
POTASSIUM SERPL-SCNC: 4.1 MMOL/L — SIGNIFICANT CHANGE UP (ref 3.5–5.3)
PROT SERPL-MCNC: 5.4 G/DL — LOW (ref 6–8.3)
PROTHROM AB SERPL-ACNC: 10.6 SEC — SIGNIFICANT CHANGE UP (ref 10.6–13.6)
RBC # BLD: 2.96 M/UL — LOW (ref 3.8–5.2)
RBC # FLD: 14.8 % — HIGH (ref 10.3–14.5)
SODIUM SERPL-SCNC: 135 MMOL/L — SIGNIFICANT CHANGE UP (ref 135–145)
URATE SERPL-MCNC: 7.2 MG/DL — HIGH (ref 2.5–7)
WBC # BLD: 9.37 K/UL — SIGNIFICANT CHANGE UP (ref 3.8–10.5)
WBC # FLD AUTO: 9.37 K/UL — SIGNIFICANT CHANGE UP (ref 3.8–10.5)

## 2021-05-23 RX ORDER — OXYCODONE HYDROCHLORIDE 5 MG/1
5 TABLET ORAL
Refills: 0 | Status: DISCONTINUED | OUTPATIENT
Start: 2021-05-23 | End: 2021-05-25

## 2021-05-23 RX ADMIN — Medication 975 MILLIGRAM(S): at 22:12

## 2021-05-23 RX ADMIN — SIMETHICONE 80 MILLIGRAM(S): 80 TABLET, CHEWABLE ORAL at 08:38

## 2021-05-23 RX ADMIN — OXYCODONE HYDROCHLORIDE 5 MILLIGRAM(S): 5 TABLET ORAL at 08:38

## 2021-05-23 RX ADMIN — Medication 1 MILLIGRAM(S): at 12:40

## 2021-05-23 RX ADMIN — Medication 1 TABLET(S): at 12:40

## 2021-05-23 RX ADMIN — Medication 975 MILLIGRAM(S): at 21:36

## 2021-05-23 RX ADMIN — OXYCODONE HYDROCHLORIDE 5 MILLIGRAM(S): 5 TABLET ORAL at 17:46

## 2021-05-23 RX ADMIN — Medication 60 MILLIGRAM(S): at 17:49

## 2021-05-23 RX ADMIN — OXYCODONE HYDROCHLORIDE 5 MILLIGRAM(S): 5 TABLET ORAL at 20:39

## 2021-05-23 RX ADMIN — SIMETHICONE 80 MILLIGRAM(S): 80 TABLET, CHEWABLE ORAL at 12:41

## 2021-05-23 RX ADMIN — OXYCODONE HYDROCHLORIDE 5 MILLIGRAM(S): 5 TABLET ORAL at 15:26

## 2021-05-23 RX ADMIN — ENOXAPARIN SODIUM 40 MILLIGRAM(S): 100 INJECTION SUBCUTANEOUS at 12:41

## 2021-05-23 RX ADMIN — Medication 975 MILLIGRAM(S): at 03:57

## 2021-05-23 RX ADMIN — Medication 30 MILLIGRAM(S): at 06:02

## 2021-05-23 RX ADMIN — FAMOTIDINE 20 MILLIGRAM(S): 10 INJECTION INTRAVENOUS at 12:40

## 2021-05-23 RX ADMIN — Medication 975 MILLIGRAM(S): at 15:26

## 2021-05-23 RX ADMIN — Medication 975 MILLIGRAM(S): at 08:35

## 2021-05-23 RX ADMIN — OXYCODONE HYDROCHLORIDE 5 MILLIGRAM(S): 5 TABLET ORAL at 23:51

## 2021-05-23 RX ADMIN — Medication 975 MILLIGRAM(S): at 03:27

## 2021-05-23 NOTE — PROGRESS NOTE ADULT - ASSESSMENT
A/P: 35yo POD#2 s/p LTCS at 31 week for siPEC with downtrending platelets, transaminitis and RUQ pain c/f HELLP. Patient Procardia 10 IR for persistent severe range BP prior to delivery. Now s/p Mg x24h postpartum for seizure ppx. Patient is stable and doing well post-operatively.    #siPEC  - s/p Mag (5/21-22) x 24 hours for seizure ppx  - c/w Procardia XL 30/60 QD  - TTE (5/15): EF 75%, hyperdynamic left ventricle   - Cardio-OB recs to increase BP meds if persistent BP IN 150s/90s  - Platelets: 189->187->165->131->134->86->76->68->77   - AST/ALT: 34/31->>41/46->39/46->73/79    - HELLP labs Q12H  - Monitor BPs, symptoms closely    #Maternal tachycardia during antepartum course  - TSH: 5.54  - Free T4: 0.9  - Continuos tele/pulse ox   - Bedside sono neg for B lines    #Postpartum state  - Continue regular diet  - Increase ambulation  - Continue Tylenol, oxycodone PRN for pain control  - Avoid NSAIDs until platelets stabilize  - F/u AM CBC    Winsome Maya, PGY3

## 2021-05-23 NOTE — PROGRESS NOTE ADULT - SUBJECTIVE AND OBJECTIVE BOX
OB Progress Note:  Delivery, POD#2    S: 37yo POD#2 s/p LTCS . Her pain is well controlled. She is tolerating a regular diet and passing flatus. Denies N/V. Denies CP/SOB/lightheadedness/dizziness.   She is ambulating without difficulty. Voiding spontaneously.     O:   Vital Signs Last 24 Hrs  T(C): 36.9 (23 May 2021 05:15), Max: 37.1 (23 May 2021 00:30)  T(F): 98.5 (23 May 2021 05:15), Max: 98.7 (23 May 2021 00:30)  HR: 107 (23 May 2021 05:15) (92 - 109)  BP: 126/72 (23 May 2021 05:15) (126/72 - 147/68)  BP(mean): --  RR: 18 (23 May 2021 05:15) (18 - 18)  SpO2: 97% (23 May 2021 05:15) (94% - 98%)    PE:  General: NAD  Cardiovascular: S1/S2+, RRR  Pulmonary: CTA  Abdomen: Mildly distended, appropriately tender, incision c/d/i, fundus firm.  Extremities: NTTP, no erythema, no pitting edema  Pelvic: Minimal lochia    Labs:  Blood type: A Negative  Rubella IgG: RPR: Negative                          8.8<L>   11.50<H> >-----------< 77<L>    (  @ 19:00 )             26.4<L>                        9.2<L>   12.35<H> >-----------< 69<L>    (  @ 12:21 )             27.6<L>                        10.4<L>   12.68<H> >-----------< 66<L>    (  @ 07:14 )             31.7<L>                        10.9<L>   6.44 >-----------< 68<L>    (  @ 00:55 )             32.5<L>                        10.9<L>   7.21 >-----------< 76<L>    (  @ 19:33 )             32.9<L>                        11.0<L>   7.72 >-----------< 86<L>    (  @ 07:28 )             33.6<L>                        11.7   9.02 >-----------< 134<L>    (  15:32 )             34.7                        10.4<L>   8.62 >-----------< 131<L>    (  @ 07:01 )             31.4<L>    21 @ 19:00      128<L>  |  96  |  17  ----------------------------<  97  4.7   |  19<L>  |  0.61    21 @ 12:21      128<L>  |  96  |  16  ----------------------------<  113<H>  5.2   |  18<L>  |  0.62    21 07:14      130<L>  |  98  |  17  ----------------------------<  104<H>  5.0   |  17<L>  |  0.56    21 @ 00:56      132<L>  |  101  |  16  ----------------------------<  87  4.4   |  17<L>  |  0.58    21 @ 19:33      134<L>  |  102  |  17  ----------------------------<  78  4.6   |  19<L>  |  0.59    21 @ 07:28      136  |  102  |  15  ----------------------------<  64<L>  4.6   |  17<L>  |  0.55    21 15:32      133<L>  |  101  |  14  ----------------------------<  96  4.6   |  17<L>  |  0.51    21 @ 06:59      134<L>  |  103  |  12  ----------------------------<  67<L>  4.1   |  17<L>  |  0.45<L>        Ca    7.8<L>      22 May 2021 19:00  Ca    8.3<L>      22 May 2021 12:21  Ca    9.0      22 May 2021 07:14  Ca    9.8      22 May 2021 00:56  Ca    10.5      21 May 2021 19:33  Ca    9.6      21 May 2021 07:28  Ca    10.6<H>      20 May 2021 15:32  Ca    9.0      20 May 2021 06:59  Mg     7.0<HH>     -  Mg     6.9<H>       Mg     6.2<H>     05-22  Mg     5.1<H>     22    TPro  5.5<L>  /  Alb  2.8<L>  /  TBili  0.2  /  DBili  x   /  AST  73<H>  /  ALT  79<H>  /  AlkPhos  72  21 @ 19:00  TPro  5.3<L>  /  Alb  2.7<L>  /  TBili  0.2  /  DBili  x   /  AST  57<H>  /  ALT  65<H>  /  AlkPhos  72  21 @ 12:21  TPro  5.7<L>  /  Alb  2.8<L>  /  TBili  0.2  /  DBili  x   /  AST  48<H>  /  ALT  53<H>  /  AlkPhos  83  21 @ 07:14  TPro  5.9<L>  /  Alb  2.9<L>  /  TBili  0.2  /  DBili  x   /  AST  39  /  ALT  46<H>  /  AlkPhos  88  21 @ 00:56  TPro  5.9<L>  /  Alb  2.9<L>  /  TBili  0.2  /  DBili  x   /  AST  41<H>  /  ALT  46<H>  /  AlkPhos  83  21 @ 19:33  TPro  6.0  /  Alb  3.1<L>  /  TBili  0.3  /  DBili  x   /  AST  36  /  ALT  37  /  AlkPhos  85  21 @ 07:28  TPro  6.2  /  Alb  3.3  /  TBili  0.3  /  DBili  x   /  AST  34  /  ALT  31  /  AlkPhos  90  21 @ 15:32  TPro  5.4<L>  /  Alb  2.9<L>  /  TBili  0.2  /  DBili  x   /  AST  23  /  ALT  22  /  AlkPhos  73  21 @ 06:59

## 2021-05-23 NOTE — PROGRESS NOTE ADULT - ATTENDING COMMENTS
ob attg note  pt is stable and progressing well, after her c/s delivery.  Today is POD#2.   Preclampsia / HELLP syndrome: improving.  BP control is adequate w/ PO Procardia.  Continue in house observation and monitoring for now.  Pt was counseled and q's answered.

## 2021-05-24 LAB
ALBUMIN SERPL ELPH-MCNC: 3 G/DL — LOW (ref 3.3–5)
ALBUMIN SERPL ELPH-MCNC: 3.2 G/DL — LOW (ref 3.3–5)
ALP SERPL-CCNC: 72 U/L — SIGNIFICANT CHANGE UP (ref 40–120)
ALP SERPL-CCNC: 83 U/L — SIGNIFICANT CHANGE UP (ref 40–120)
ALT FLD-CCNC: 83 U/L — HIGH (ref 10–45)
ALT FLD-CCNC: 90 U/L — HIGH (ref 10–45)
ANION GAP SERPL CALC-SCNC: 12 MMOL/L — SIGNIFICANT CHANGE UP (ref 5–17)
ANION GAP SERPL CALC-SCNC: 14 MMOL/L — SIGNIFICANT CHANGE UP (ref 5–17)
APTT BLD: 31 SEC — SIGNIFICANT CHANGE UP (ref 27.5–35.5)
APTT BLD: 31 SEC — SIGNIFICANT CHANGE UP (ref 27.5–35.5)
AST SERPL-CCNC: 52 U/L — HIGH (ref 10–40)
AST SERPL-CCNC: 56 U/L — HIGH (ref 10–40)
BASOPHILS # BLD AUTO: 0.02 K/UL — SIGNIFICANT CHANGE UP (ref 0–0.2)
BASOPHILS # BLD AUTO: 0.02 K/UL — SIGNIFICANT CHANGE UP (ref 0–0.2)
BASOPHILS NFR BLD AUTO: 0.2 % — SIGNIFICANT CHANGE UP (ref 0–2)
BASOPHILS NFR BLD AUTO: 0.2 % — SIGNIFICANT CHANGE UP (ref 0–2)
BILIRUB SERPL-MCNC: 0.2 MG/DL — SIGNIFICANT CHANGE UP (ref 0.2–1.2)
BILIRUB SERPL-MCNC: 0.3 MG/DL — SIGNIFICANT CHANGE UP (ref 0.2–1.2)
BUN SERPL-MCNC: 10 MG/DL — SIGNIFICANT CHANGE UP (ref 7–23)
BUN SERPL-MCNC: 12 MG/DL — SIGNIFICANT CHANGE UP (ref 7–23)
CALCIUM SERPL-MCNC: 9.2 MG/DL — SIGNIFICANT CHANGE UP (ref 8.4–10.5)
CALCIUM SERPL-MCNC: 9.7 MG/DL — SIGNIFICANT CHANGE UP (ref 8.4–10.5)
CHLORIDE SERPL-SCNC: 101 MMOL/L — SIGNIFICANT CHANGE UP (ref 96–108)
CHLORIDE SERPL-SCNC: 101 MMOL/L — SIGNIFICANT CHANGE UP (ref 96–108)
CO2 SERPL-SCNC: 21 MMOL/L — LOW (ref 22–31)
CO2 SERPL-SCNC: 22 MMOL/L — SIGNIFICANT CHANGE UP (ref 22–31)
CREAT SERPL-MCNC: 0.48 MG/DL — LOW (ref 0.5–1.3)
CREAT SERPL-MCNC: 0.5 MG/DL — SIGNIFICANT CHANGE UP (ref 0.5–1.3)
EOSINOPHIL # BLD AUTO: 0.04 K/UL — SIGNIFICANT CHANGE UP (ref 0–0.5)
EOSINOPHIL # BLD AUTO: 0.05 K/UL — SIGNIFICANT CHANGE UP (ref 0–0.5)
EOSINOPHIL NFR BLD AUTO: 0.4 % — SIGNIFICANT CHANGE UP (ref 0–6)
EOSINOPHIL NFR BLD AUTO: 0.6 % — SIGNIFICANT CHANGE UP (ref 0–6)
FIBRINOGEN PPP-MCNC: 1200 MG/DL — HIGH (ref 290–520)
FIBRINOGEN PPP-MCNC: 996 MG/DL — HIGH (ref 290–520)
GLUCOSE SERPL-MCNC: 69 MG/DL — LOW (ref 70–99)
GLUCOSE SERPL-MCNC: 98 MG/DL — SIGNIFICANT CHANGE UP (ref 70–99)
HCT VFR BLD CALC: 26.5 % — LOW (ref 34.5–45)
HCT VFR BLD CALC: 27.6 % — LOW (ref 34.5–45)
HGB BLD-MCNC: 8.7 G/DL — LOW (ref 11.5–15.5)
HGB BLD-MCNC: 9 G/DL — LOW (ref 11.5–15.5)
IMM GRANULOCYTES NFR BLD AUTO: 2.8 % — HIGH (ref 0–1.5)
IMM GRANULOCYTES NFR BLD AUTO: 3 % — HIGH (ref 0–1.5)
INR BLD: 0.89 RATIO — SIGNIFICANT CHANGE UP (ref 0.88–1.16)
INR BLD: 0.9 RATIO — SIGNIFICANT CHANGE UP (ref 0.88–1.16)
LDH SERPL L TO P-CCNC: 215 U/L — SIGNIFICANT CHANGE UP (ref 50–242)
LDH SERPL L TO P-CCNC: 244 U/L — HIGH (ref 50–242)
LYMPHOCYTES # BLD AUTO: 2.32 K/UL — SIGNIFICANT CHANGE UP (ref 1–3.3)
LYMPHOCYTES # BLD AUTO: 2.39 K/UL — SIGNIFICANT CHANGE UP (ref 1–3.3)
LYMPHOCYTES # BLD AUTO: 20.9 % — SIGNIFICANT CHANGE UP (ref 13–44)
LYMPHOCYTES # BLD AUTO: 26.9 % — SIGNIFICANT CHANGE UP (ref 13–44)
MCHC RBC-ENTMCNC: 27.9 PG — SIGNIFICANT CHANGE UP (ref 27–34)
MCHC RBC-ENTMCNC: 28.1 PG — SIGNIFICANT CHANGE UP (ref 27–34)
MCHC RBC-ENTMCNC: 32.6 GM/DL — SIGNIFICANT CHANGE UP (ref 32–36)
MCHC RBC-ENTMCNC: 32.8 GM/DL — SIGNIFICANT CHANGE UP (ref 32–36)
MCV RBC AUTO: 85.4 FL — SIGNIFICANT CHANGE UP (ref 80–100)
MCV RBC AUTO: 85.5 FL — SIGNIFICANT CHANGE UP (ref 80–100)
MONOCYTES # BLD AUTO: 0.68 K/UL — SIGNIFICANT CHANGE UP (ref 0–0.9)
MONOCYTES # BLD AUTO: 0.69 K/UL — SIGNIFICANT CHANGE UP (ref 0–0.9)
MONOCYTES NFR BLD AUTO: 6.1 % — SIGNIFICANT CHANGE UP (ref 2–14)
MONOCYTES NFR BLD AUTO: 7.8 % — SIGNIFICANT CHANGE UP (ref 2–14)
NEUTROPHILS # BLD AUTO: 5.5 K/UL — SIGNIFICANT CHANGE UP (ref 1.8–7.4)
NEUTROPHILS # BLD AUTO: 7.73 K/UL — HIGH (ref 1.8–7.4)
NEUTROPHILS NFR BLD AUTO: 61.7 % — SIGNIFICANT CHANGE UP (ref 43–77)
NEUTROPHILS NFR BLD AUTO: 69.4 % — SIGNIFICANT CHANGE UP (ref 43–77)
NRBC # BLD: 0 /100 WBCS — SIGNIFICANT CHANGE UP (ref 0–0)
NRBC # BLD: 0 /100 WBCS — SIGNIFICANT CHANGE UP (ref 0–0)
PLATELET # BLD AUTO: 100 K/UL — LOW (ref 150–400)
PLATELET # BLD AUTO: 95 K/UL — LOW (ref 150–400)
POTASSIUM SERPL-MCNC: 4.2 MMOL/L — SIGNIFICANT CHANGE UP (ref 3.5–5.3)
POTASSIUM SERPL-MCNC: 4.2 MMOL/L — SIGNIFICANT CHANGE UP (ref 3.5–5.3)
POTASSIUM SERPL-SCNC: 4.2 MMOL/L — SIGNIFICANT CHANGE UP (ref 3.5–5.3)
POTASSIUM SERPL-SCNC: 4.2 MMOL/L — SIGNIFICANT CHANGE UP (ref 3.5–5.3)
PROT SERPL-MCNC: 5.9 G/DL — LOW (ref 6–8.3)
PROT SERPL-MCNC: 6.3 G/DL — SIGNIFICANT CHANGE UP (ref 6–8.3)
PROTHROM AB SERPL-ACNC: 10.7 SEC — SIGNIFICANT CHANGE UP (ref 10.6–13.6)
PROTHROM AB SERPL-ACNC: 10.9 SEC — SIGNIFICANT CHANGE UP (ref 10.6–13.6)
RBC # BLD: 3.1 M/UL — LOW (ref 3.8–5.2)
RBC # BLD: 3.23 M/UL — LOW (ref 3.8–5.2)
RBC # FLD: 14.6 % — HIGH (ref 10.3–14.5)
RBC # FLD: 14.6 % — HIGH (ref 10.3–14.5)
SODIUM SERPL-SCNC: 135 MMOL/L — SIGNIFICANT CHANGE UP (ref 135–145)
SODIUM SERPL-SCNC: 136 MMOL/L — SIGNIFICANT CHANGE UP (ref 135–145)
URATE SERPL-MCNC: 6.1 MG/DL — SIGNIFICANT CHANGE UP (ref 2.5–7)
URATE SERPL-MCNC: 6.3 MG/DL — SIGNIFICANT CHANGE UP (ref 2.5–7)
WBC # BLD: 11.12 K/UL — HIGH (ref 3.8–10.5)
WBC # BLD: 8.9 K/UL — SIGNIFICANT CHANGE UP (ref 3.8–10.5)
WBC # FLD AUTO: 11.12 K/UL — HIGH (ref 3.8–10.5)
WBC # FLD AUTO: 8.9 K/UL — SIGNIFICANT CHANGE UP (ref 3.8–10.5)

## 2021-05-24 RX ORDER — IBUPROFEN 200 MG
600 TABLET ORAL EVERY 6 HOURS
Refills: 0 | Status: DISCONTINUED | OUTPATIENT
Start: 2021-05-24 | End: 2021-05-25

## 2021-05-24 RX ADMIN — Medication 975 MILLIGRAM(S): at 03:23

## 2021-05-24 RX ADMIN — Medication 30 MILLIGRAM(S): at 06:18

## 2021-05-24 RX ADMIN — OXYCODONE HYDROCHLORIDE 5 MILLIGRAM(S): 5 TABLET ORAL at 15:05

## 2021-05-24 RX ADMIN — OXYCODONE HYDROCHLORIDE 5 MILLIGRAM(S): 5 TABLET ORAL at 09:45

## 2021-05-24 RX ADMIN — Medication 975 MILLIGRAM(S): at 15:05

## 2021-05-24 RX ADMIN — Medication 600 MILLIGRAM(S): at 23:27

## 2021-05-24 RX ADMIN — Medication 60 MILLIGRAM(S): at 17:13

## 2021-05-24 RX ADMIN — Medication 600 MILLIGRAM(S): at 12:15

## 2021-05-24 RX ADMIN — Medication 600 MILLIGRAM(S): at 17:13

## 2021-05-24 RX ADMIN — OXYCODONE HYDROCHLORIDE 5 MILLIGRAM(S): 5 TABLET ORAL at 15:30

## 2021-05-24 RX ADMIN — OXYCODONE HYDROCHLORIDE 5 MILLIGRAM(S): 5 TABLET ORAL at 09:21

## 2021-05-24 RX ADMIN — ENOXAPARIN SODIUM 40 MILLIGRAM(S): 100 INJECTION SUBCUTANEOUS at 12:20

## 2021-05-24 RX ADMIN — Medication 1 MILLIGRAM(S): at 12:20

## 2021-05-24 RX ADMIN — Medication 975 MILLIGRAM(S): at 21:30

## 2021-05-24 RX ADMIN — OXYCODONE HYDROCHLORIDE 5 MILLIGRAM(S): 5 TABLET ORAL at 00:31

## 2021-05-24 RX ADMIN — Medication 1 TABLET(S): at 12:20

## 2021-05-24 RX ADMIN — Medication 600 MILLIGRAM(S): at 11:14

## 2021-05-24 RX ADMIN — Medication 975 MILLIGRAM(S): at 20:53

## 2021-05-24 RX ADMIN — FAMOTIDINE 20 MILLIGRAM(S): 10 INJECTION INTRAVENOUS at 12:20

## 2021-05-24 RX ADMIN — Medication 600 MILLIGRAM(S): at 23:58

## 2021-05-24 RX ADMIN — Medication 975 MILLIGRAM(S): at 02:53

## 2021-05-24 NOTE — PROGRESS NOTE ADULT - ATTENDING COMMENTS
OB Attending NOTE: AGREE WITH ABOVE, PATIENT SEEN BY ME. s/p c/s pod#3 + PREECLAMPSIA AND+HELLP  SYNDROME. PATIENT DENIES HEADACHES, NO n/v, NO RUQ  PAIN. Patient stable, incision c/d/i, uterus firm, LE no calf tenderness b/L  continue to monitor. BP well controlled S/P magnesium sulfate, S/p echo, Patient on procardia 30/60mg. Patient stable.

## 2021-05-24 NOTE — PROGRESS NOTE ADULT - ASSESSMENT
A/P: 37yo POD#3 s/p LTCS at 31 week for siPEC with downtrending platelets, transaminitis and RUQ pain c/f HELLP syndrome. s/p Mag. BPs well controlled on PO meds overnight. Patient is asymptomatic, stable and doing well post-operatively.    #siPEC  - s/p Mag x24 hours for seizure ppx  - c/w Procardia XL 30 QAM / 60 QHS   - TTE (5/15): EF 75%, hyperdynamic left ventricle   - Cardio-OB recs to increase BP meds if persistent BP IN 150s/90s  - Platelets: 189->187->165->131->134->86->76->68->77->90  - Heparin induced thrombocytopenia labs sent, negative  - AST/ALT: 34/31->>41/46->39/46->73/79->65/88    - f/u AM HELLP labs   - Monitor BPs, symptoms closely    #Maternal tachycardia during antepartum course  - TSH: 5.54  - Free T4: 0.9    #Postpartum state  - Continue regular diet  - Increase ambulation  - Continue Tylenol, Motrin, Oxy PRN for pain control  - Lovenox for DVT ppx    C. Peterson PGY3

## 2021-05-24 NOTE — PROGRESS NOTE ADULT - SUBJECTIVE AND OBJECTIVE BOX
OB Postpartum Note    S: The patient feels well.  Pain is well controlled. She is tolerating a regular diet and passing flatus. She is voiding spontaneously, and ambulating without difficulty. Denies CP/SOB. Denies lightheadedness/dizziness. Denies N/V.    O:  Vitals:  Vital Signs Last 24 Hrs  T(C): 37.3 (24 May 2021 00:48), Max: 37.7 (23 May 2021 21:43)  T(F): 99.1 (24 May 2021 00:48), Max: 99.8 (23 May 2021 21:43)  HR: 96 (24 May 2021 00:48) (94 - 109)  BP: 125/82 (24 May 2021 00:48) (115/79 - 136/85)  BP(mean): --  RR: 18 (24 May 2021 00:48) (18 - 18)  SpO2: 99% (24 May 2021 00:48) (97% - 99%)    MEDICATIONS  (STANDING):  acetaminophen   Tablet .. 975 milliGRAM(s) Oral <User Schedule>  diphtheria/tetanus/pertussis (acellular) Vaccine (ADAcel) 0.5 milliLiter(s) IntraMuscular once  enoxaparin Injectable 40 milliGRAM(s) SubCutaneous daily  famotidine    Tablet 20 milliGRAM(s) Oral daily  folic acid 1 milliGRAM(s) Oral daily  lactated ringers. 1000 milliLiter(s) (50 mL/Hr) IV Continuous <Continuous>  lactated ringers. 1000 milliLiter(s) (125 mL/Hr) IV Continuous <Continuous>  magnesium sulfate Infusion 1.5 Gm/Hr (37.5 mL/Hr) IV Continuous <Continuous>  NIFEdipine XL 30 milliGRAM(s) Oral every 24 hours  NIFEdipine XL 60 milliGRAM(s) Oral at bedtime  oxytocin Infusion 333.333 milliUNIT(s)/Min (1000 mL/Hr) IV Continuous <Continuous>  prenatal multivitamin 1 Tablet(s) Oral daily  prenatal multivitamin 1 Tablet(s) Oral daily  simethicone 80 milliGRAM(s) Chew daily    MEDICATIONS  (PRN):  calcium carbonate    500 mG (Tums) Chewable 1 Tablet(s) Chew every 6 hours PRN Heartburn  diphenhydrAMINE 25 milliGRAM(s) Oral every 6 hours PRN Pruritus  famotidine    Tablet 20 milliGRAM(s) Oral daily PRN reflux, indigestion  lanolin Ointment 1 Application(s) Topical every 6 hours PRN Sore Nipples  magnesium hydroxide Suspension 30 milliLiter(s) Oral two times a day PRN Constipation  oxyCODONE    IR 5 milliGRAM(s) Oral once PRN Moderate to Severe Pain (4-10)  oxyCODONE    IR 5 milliGRAM(s) Oral every 3 hours PRN Moderate to Severe Pain (4-10)  simethicone 80 milliGRAM(s) Chew every 4 hours PRN Gas  sodium chloride 0.65% Nasal 1 Spray(s) Both Nostrils four times a day PRN Nasal Congestion      LABS:  Blood type: A Negative  Rubella IgG: RPR: Negative                          8.3<L>   9.37 >-----------< 90<L>    ( 05-23 @ 06:49 )             25.2<L>                        8.8<L>   11.50<H> >-----------< 77<L>    ( 05-22 @ 19:00 )             26.4<L>                        9.2<L>   12.35<H> >-----------< 69<L>    ( 05-22 @ 12:21 )             27.6<L>                        10.4<L>   12.68<H> >-----------< 66<L>    ( 05-22 @ 07:14 )             31.7<L>                        10.9<L>   6.44 >-----------< 68<L>    ( 05-22 @ 00:55 )             32.5<L>                        10.9<L>   7.21 >-----------< 76<L>    ( 05-21 @ 19:33 )             32.9<L>                        11.0<L>   7.72 >-----------< 86<L>    ( 05-21 @ 07:28 )             33.6<L>    05-23-21 @ 06:47      135  |  103  |  15  ----------------------------<  71  4.1   |  20<L>  |  0.57    05-22-21 @ 19:00      128<L>  |  96  |  17  ----------------------------<  97  4.7   |  19<L>  |  0.61    05-22-21 @ 12:21      128<L>  |  96  |  16  ----------------------------<  113<H>  5.2   |  18<L>  |  0.62    05-22-21 @ 07:14      130<L>  |  98  |  17  ----------------------------<  104<H>  5.0   |  17<L>  |  0.56    05-22-21 @ 00:56      132<L>  |  101  |  16  ----------------------------<  87  4.4   |  17<L>  |  0.58    05-21-21 @ 19:33      134<L>  |  102  |  17  ----------------------------<  78  4.6   |  19<L>  |  0.59    05-21-21 @ 07:28      136  |  102  |  15  ----------------------------<  64<L>  4.6   |  17<L>  |  0.55        Ca    7.8<L>      23 May 2021 06:47  Ca    7.8<L>      22 May 2021 19:00  Ca    8.3<L>      22 May 2021 12:21  Ca    9.0      22 May 2021 07:14  Ca    9.8      22 May 2021 00:56  Ca    10.5      21 May 2021 19:33  Ca    9.6      21 May 2021 07:28  Mg     7.0<HH>     05-22  Mg     6.9<H>     05-22  Mg     6.2<H>     05-22  Mg     5.1<H>     05-22    TPro  5.4<L>  /  Alb  2.6<L>  /  TBili  0.1<L>  /  DBili  x   /  AST  65<H>  /  ALT  88<H>  /  AlkPhos  65  05-23-21 @ 06:47  TPro  5.5<L>  /  Alb  2.8<L>  /  TBili  0.2  /  DBili  x   /  AST  73<H>  /  ALT  79<H>  /  AlkPhos  72  05-22-21 @ 19:00  TPro  5.3<L>  /  Alb  2.7<L>  /  TBili  0.2  /  DBili  x   /  AST  57<H>  /  ALT  65<H>  /  AlkPhos  72  05-22-21 @ 12:21  TPro  5.7<L>  /  Alb  2.8<L>  /  TBili  0.2  /  DBili  x   /  AST  48<H>  /  ALT  53<H>  /  AlkPhos  83  05-22-21 @ 07:14  TPro  5.9<L>  /  Alb  2.9<L>  /  TBili  0.2  /  DBili  x   /  AST  39  /  ALT  46<H>  /  AlkPhos  88  05-22-21 @ 00:56  TPro  5.9<L>  /  Alb  2.9<L>  /  TBili  0.2  /  DBili  x   /  AST  41<H>  /  ALT  46<H>  /  AlkPhos  83  05-21-21 @ 19:33  TPro  6.0  /  Alb  3.1<L>  /  TBili  0.3  /  DBili  x   /  AST  36  /  ALT  37  /  AlkPhos  85  05-21-21 @ 07:28      Physical exam:  Gen: NAD  Abdomen: Soft, nontender, no distension , firm uterine fundus at umbilicus.  Incision: Clean, dry, and intact   Pelvic: Normal lochia noted  Ext: No calf tenderness

## 2021-05-25 ENCOUNTER — TRANSCRIPTION ENCOUNTER (OUTPATIENT)
Age: 36
End: 2021-05-25

## 2021-05-25 VITALS — DIASTOLIC BLOOD PRESSURE: 82 MMHG | SYSTOLIC BLOOD PRESSURE: 129 MMHG | HEART RATE: 109 BPM

## 2021-05-25 LAB
ALBUMIN SERPL ELPH-MCNC: 3 G/DL — LOW (ref 3.3–5)
ALP SERPL-CCNC: 85 U/L — SIGNIFICANT CHANGE UP (ref 40–120)
ALT FLD-CCNC: 84 U/L — HIGH (ref 10–45)
ANION GAP SERPL CALC-SCNC: 14 MMOL/L — SIGNIFICANT CHANGE UP (ref 5–17)
APTT BLD: 30.3 SEC — SIGNIFICANT CHANGE UP (ref 27.5–35.5)
AST SERPL-CCNC: 48 U/L — HIGH (ref 10–40)
BASOPHILS # BLD AUTO: 0.03 K/UL — SIGNIFICANT CHANGE UP (ref 0–0.2)
BASOPHILS NFR BLD AUTO: 0.3 % — SIGNIFICANT CHANGE UP (ref 0–2)
BILIRUB SERPL-MCNC: 0.2 MG/DL — SIGNIFICANT CHANGE UP (ref 0.2–1.2)
BUN SERPL-MCNC: 14 MG/DL — SIGNIFICANT CHANGE UP (ref 7–23)
CALCIUM SERPL-MCNC: 9.7 MG/DL — SIGNIFICANT CHANGE UP (ref 8.4–10.5)
CHLORIDE SERPL-SCNC: 101 MMOL/L — SIGNIFICANT CHANGE UP (ref 96–108)
CO2 SERPL-SCNC: 20 MMOL/L — LOW (ref 22–31)
CREAT SERPL-MCNC: 0.48 MG/DL — LOW (ref 0.5–1.3)
EOSINOPHIL # BLD AUTO: 0.11 K/UL — SIGNIFICANT CHANGE UP (ref 0–0.5)
EOSINOPHIL NFR BLD AUTO: 1.3 % — SIGNIFICANT CHANGE UP (ref 0–6)
FIBRINOGEN PPP-MCNC: 1258 MG/DL — HIGH (ref 290–520)
GLUCOSE SERPL-MCNC: 73 MG/DL — SIGNIFICANT CHANGE UP (ref 70–99)
HCT VFR BLD CALC: 25.9 % — LOW (ref 34.5–45)
HGB BLD-MCNC: 8.5 G/DL — LOW (ref 11.5–15.5)
IMM GRANULOCYTES NFR BLD AUTO: 4.2 % — HIGH (ref 0–1.5)
INR BLD: 0.93 RATIO — SIGNIFICANT CHANGE UP (ref 0.88–1.16)
LDH SERPL L TO P-CCNC: 206 U/L — SIGNIFICANT CHANGE UP (ref 50–242)
LYMPHOCYTES # BLD AUTO: 2.32 K/UL — SIGNIFICANT CHANGE UP (ref 1–3.3)
LYMPHOCYTES # BLD AUTO: 26.5 % — SIGNIFICANT CHANGE UP (ref 13–44)
MCHC RBC-ENTMCNC: 28.2 PG — SIGNIFICANT CHANGE UP (ref 27–34)
MCHC RBC-ENTMCNC: 32.8 GM/DL — SIGNIFICANT CHANGE UP (ref 32–36)
MCV RBC AUTO: 86 FL — SIGNIFICANT CHANGE UP (ref 80–100)
MONOCYTES # BLD AUTO: 0.62 K/UL — SIGNIFICANT CHANGE UP (ref 0–0.9)
MONOCYTES NFR BLD AUTO: 7.1 % — SIGNIFICANT CHANGE UP (ref 2–14)
NEUTROPHILS # BLD AUTO: 5.29 K/UL — SIGNIFICANT CHANGE UP (ref 1.8–7.4)
NEUTROPHILS NFR BLD AUTO: 60.6 % — SIGNIFICANT CHANGE UP (ref 43–77)
NRBC # BLD: 0 /100 WBCS — SIGNIFICANT CHANGE UP (ref 0–0)
PLATELET # BLD AUTO: 113 K/UL — LOW (ref 150–400)
POTASSIUM SERPL-MCNC: 4 MMOL/L — SIGNIFICANT CHANGE UP (ref 3.5–5.3)
POTASSIUM SERPL-SCNC: 4 MMOL/L — SIGNIFICANT CHANGE UP (ref 3.5–5.3)
PROT SERPL-MCNC: 6 G/DL — SIGNIFICANT CHANGE UP (ref 6–8.3)
PROTHROM AB SERPL-ACNC: 11.2 SEC — SIGNIFICANT CHANGE UP (ref 10.6–13.6)
RBC # BLD: 3.01 M/UL — LOW (ref 3.8–5.2)
RBC # FLD: 14.6 % — HIGH (ref 10.3–14.5)
SODIUM SERPL-SCNC: 135 MMOL/L — SIGNIFICANT CHANGE UP (ref 135–145)
URATE SERPL-MCNC: 5.6 MG/DL — SIGNIFICANT CHANGE UP (ref 2.5–7)
WBC # BLD: 8.74 K/UL — SIGNIFICANT CHANGE UP (ref 3.8–10.5)
WBC # FLD AUTO: 8.74 K/UL — SIGNIFICANT CHANGE UP (ref 3.8–10.5)

## 2021-05-25 RX ORDER — IBUPROFEN 200 MG
1 TABLET ORAL
Qty: 0 | Refills: 0 | DISCHARGE
Start: 2021-05-25

## 2021-05-25 RX ORDER — OXYCODONE HYDROCHLORIDE 5 MG/1
1 TABLET ORAL
Qty: 12 | Refills: 0
Start: 2021-05-25 | End: 2021-05-27

## 2021-05-25 RX ORDER — ACETAMINOPHEN 500 MG
3 TABLET ORAL
Qty: 0 | Refills: 0 | DISCHARGE
Start: 2021-05-25

## 2021-05-25 RX ORDER — NIFEDIPINE 30 MG
1 TABLET, EXTENDED RELEASE 24 HR ORAL
Qty: 60 | Refills: 0
Start: 2021-05-25 | End: 2021-07-23

## 2021-05-25 RX ADMIN — Medication 975 MILLIGRAM(S): at 03:22

## 2021-05-25 RX ADMIN — Medication 975 MILLIGRAM(S): at 15:59

## 2021-05-25 RX ADMIN — Medication 600 MILLIGRAM(S): at 06:54

## 2021-05-25 RX ADMIN — SIMETHICONE 80 MILLIGRAM(S): 80 TABLET, CHEWABLE ORAL at 12:43

## 2021-05-25 RX ADMIN — SIMETHICONE 80 MILLIGRAM(S): 80 TABLET, CHEWABLE ORAL at 08:06

## 2021-05-25 RX ADMIN — FAMOTIDINE 20 MILLIGRAM(S): 10 INJECTION INTRAVENOUS at 12:43

## 2021-05-25 RX ADMIN — Medication 600 MILLIGRAM(S): at 13:30

## 2021-05-25 RX ADMIN — Medication 975 MILLIGRAM(S): at 08:06

## 2021-05-25 RX ADMIN — Medication 30 MILLIGRAM(S): at 05:39

## 2021-05-25 RX ADMIN — Medication 975 MILLIGRAM(S): at 02:44

## 2021-05-25 RX ADMIN — ENOXAPARIN SODIUM 40 MILLIGRAM(S): 100 INJECTION SUBCUTANEOUS at 12:44

## 2021-05-25 RX ADMIN — Medication 600 MILLIGRAM(S): at 05:39

## 2021-05-25 RX ADMIN — Medication 1 TABLET(S): at 12:43

## 2021-05-25 RX ADMIN — Medication 600 MILLIGRAM(S): at 12:43

## 2021-05-25 RX ADMIN — Medication 975 MILLIGRAM(S): at 09:00

## 2021-05-25 RX ADMIN — Medication 1 MILLIGRAM(S): at 12:43

## 2021-05-25 NOTE — PROGRESS NOTE ADULT - ATTENDING COMMENTS
Patient doing well  Blood pressure WNL  No acute issues  Cleared for discharge  Continue  procardia   office visit Friday for BP check

## 2021-05-25 NOTE — DISCHARGE NOTE OB - PROVIDER TOKENS
FREE:[LAST:[Clinic],PHONE:[(   )    -],FAX:[(   )    -],ADDRESS:[Clinic,   Please follow up for a blood pressure check in 2-3days.  Please follow up with us 6 weeks after your delivery date for vaginal deliveries or 2 weeks after your delivery date for  sections.      Your postpartum visit will be at [address] [place] [address], 2nd floor.    Please schedule an appointment (PHONE #  [phone] )  Phone: (   )    -  Fax: (   )    -  Follow Up Time:]]

## 2021-05-25 NOTE — DISCHARGE NOTE OB - PATIENT PORTAL LINK FT
You can access the FollowMyHealth Patient Portal offered by [facility] by registering at the following website: [email or web address]. By joining [facility]’s FollowMyHealth portal, you will also be able to view your health information using other applications (apps) compatible with our system.

## 2021-05-25 NOTE — DISCHARGE NOTE OB - CARE PLAN
Principal Discharge DX:	 delivery delivered  Goal:	Well-being  Assessment and plan of treatment:	Regular diet as tolerated, regular activity as tolerated, no heavy lifting for first two weeks.  Nothing per vagina: no intercourse, tampons or douching.  Call your provider if you experience fevers, chills, worsening abdominal pain, inability to urinate or worsening vaginal bleeding.  Follow up with your provider in 2 weeks.   Follow up with your ObGyn in two days for blood pressure check.  Take home blood pressures daily, call for pressures with systolic over 150 or diastolic over 90.  If you do not have a blood pressure cuff at home either purchase one or go to a local pharmacy with a  BP cuff you can use.    If you have headaches that do not go away with tylenol, pain in your right upper abdomen, notable increase in edema (swelling) of your legs or hands, or visual changes please call your ObGyn.

## 2021-05-25 NOTE — DISCHARGE NOTE OB - MEDICATION SUMMARY - MEDICATIONS TO TAKE
I will START or STAY ON the medications listed below when I get home from the hospital:    acetaminophen 325 mg oral tablet  -- 3 tab(s) by mouth   -- Indication: For Pain    ibuprofen 600 mg oral tablet  -- 1 tab(s) by mouth every 6 hours, As needed, Mild Pain (1 - 3)  -- Indication: For Pain    ibuprofen 600 mg oral tablet  -- 1 tab(s) by mouth every 6 hours  -- Indication: For Pain    oxyCODONE 5 mg oral tablet  -- 1 tab(s) by mouth every 6 hours MDD:4  -- Caution federal law prohibits the transfer of this drug to any person other  than the person for whom it was prescribed.  It is very important that you take or use this exactly as directed.  Do not skip doses or discontinue unless directed by your doctor.  May cause drowsiness or dizziness.  This prescription cannot be refilled.  Using more of this medication than prescribed may cause serious breathing problems.    -- Indication: For Pain    Procardia XL 30 mg oral tablet, extended release  -- 1 tab(s) by mouth once a day   -- Avoid grapefruit and grapefruit juice while taking this medication.  It is very important that you take or use this exactly as directed.  Do not skip doses or discontinue unless directed by your doctor.  Some non-prescription drugs may aggravate your condition.  Read all labels carefully.  If a warning appears, check with your doctor before taking.  Swallow whole.  Do not crush.    -- Indication: For Hypertension    Procardia XL 60 mg oral tablet, extended release  -- 1 tab(s) by mouth once a day (at bedtime)   -- Avoid grapefruit and grapefruit juice while taking this medication.  It is very important that you take or use this exactly as directed.  Do not skip doses or discontinue unless directed by your doctor.  Some non-prescription drugs may aggravate your condition.  Read all labels carefully.  If a warning appears, check with your doctor before taking.  Swallow whole.  Do not crush.    -- Indication: For Hypertension

## 2021-05-25 NOTE — PROGRESS NOTE ADULT - NSICDXPILOT_GEN_ALL_CORE
[place]

## 2021-05-25 NOTE — DISCHARGE NOTE OB - PLAN OF CARE
Well-being Regular diet as tolerated, regular activity as tolerated, no heavy lifting for first two weeks.  Nothing per vagina: no intercourse, tampons or douching.  Call your provider if you experience fevers, chills, worsening abdominal pain, inability to urinate or worsening vaginal bleeding.  Follow up with your provider in 2 weeks.   Follow up with your ObGyn in two days for blood pressure check.  Take home blood pressures daily, call for pressures with systolic over 150 or diastolic over 90.  If you do not have a blood pressure cuff at home either purchase one or go to a local pharmacy with a  BP cuff you can use.    If you have headaches that do not go away with tylenol, pain in your right upper abdomen, notable increase in edema (swelling) of your legs or hands, or visual changes please call your ObGyn.

## 2021-05-25 NOTE — PROGRESS NOTE ADULT - ASSESSMENT
A/P: 35yo POD#4 s/p LTCS at 31 week for siPEC with downtrending platelets, transaminitis and RUQ pain c/f HELLP syndrome. s/p Mag. BPs well controlled on PO meds overnight. Patient is asymptomatic, stable and doing well post-operatively.    #siPEC  - s/p Mag x24 hours for seizure ppx  - c/w Procardia XL 30 QAM / 60 QHS   - TTE (5/15): EF 75%, hyperdynamic left ventricle   - Cardio-OB recs to increase BP meds if persistent BP IN 150s/90s  - Platelets: 189->187->165->131->134->86->76->68->77->90  - Heparin induced thrombocytopenia labs sent, negative  - AST/ALT: 34/31->>41/46->39/46->73/79->65/88    - f/u AM HELLP labs   - Monitor BPs, symptoms closely    #Maternal tachycardia during antepartum course  - TSH: 5.54  - Free T4: 0.9    #Postpartum state  - Continue regular diet  - Increase ambulation  - Continue Tylenol, Motrin, Oxy PRN for pain control  - Lovenox for DVT ppx    C. Peterson PGY3

## 2021-05-25 NOTE — PROGRESS NOTE ADULT - SUBJECTIVE AND OBJECTIVE BOX
OB Postpartum Note    S: 35yo POD#3 s/p LTCS. The patient feels well.  Pain is well controlled. She is tolerating a regular diet and passing flatus. She is voiding spontaneously, and ambulating without difficulty. Denies CP/SOB. Denies lightheadedness/dizziness. Denies N/V. Denies HA, blurry vision, RUQ Pain, edema.    O:  Vitals:  Vital Signs Last 24 Hrs  T(C): 37 (25 May 2021 00:33), Max: 37.4 (24 May 2021 17:14)  T(F): 98.6 (25 May 2021 00:33), Max: 99.3 (24 May 2021 17:14)  HR: 101 (25 May 2021 00:33) (97 - 115)  BP: 127/83 (25 May 2021 00:33) (119/79 - 144/77)  BP(mean): --  RR: 18 (25 May 2021 00:33) (18 - 18)  SpO2: 100% (25 May 2021 00:33) (95% - 100%)    MEDICATIONS  (STANDING):  acetaminophen   Tablet .. 975 milliGRAM(s) Oral <User Schedule>  diphtheria/tetanus/pertussis (acellular) Vaccine (ADAcel) 0.5 milliLiter(s) IntraMuscular once  enoxaparin Injectable 40 milliGRAM(s) SubCutaneous daily  famotidine    Tablet 20 milliGRAM(s) Oral daily  folic acid 1 milliGRAM(s) Oral daily  ibuprofen  Tablet. 600 milliGRAM(s) Oral every 6 hours  lactated ringers. 1000 milliLiter(s) (125 mL/Hr) IV Continuous <Continuous>  lactated ringers. 1000 milliLiter(s) (50 mL/Hr) IV Continuous <Continuous>  magnesium sulfate Infusion 1.5 Gm/Hr (37.5 mL/Hr) IV Continuous <Continuous>  NIFEdipine XL 30 milliGRAM(s) Oral every 24 hours  NIFEdipine XL 60 milliGRAM(s) Oral at bedtime  oxytocin Infusion 333.333 milliUNIT(s)/Min (1000 mL/Hr) IV Continuous <Continuous>  prenatal multivitamin 1 Tablet(s) Oral daily  prenatal multivitamin 1 Tablet(s) Oral daily  simethicone 80 milliGRAM(s) Chew daily    MEDICATIONS  (PRN):  calcium carbonate    500 mG (Tums) Chewable 1 Tablet(s) Chew every 6 hours PRN Heartburn  diphenhydrAMINE 25 milliGRAM(s) Oral every 6 hours PRN Pruritus  famotidine    Tablet 20 milliGRAM(s) Oral daily PRN reflux, indigestion  ibuprofen  Tablet. 600 milliGRAM(s) Oral every 6 hours PRN Mild Pain (1 - 3)  lanolin Ointment 1 Application(s) Topical every 6 hours PRN Sore Nipples  magnesium hydroxide Suspension 30 milliLiter(s) Oral two times a day PRN Constipation  oxyCODONE    IR 5 milliGRAM(s) Oral every 3 hours PRN Moderate to Severe Pain (4-10)  oxyCODONE    IR 5 milliGRAM(s) Oral once PRN Moderate to Severe Pain (4-10)  simethicone 80 milliGRAM(s) Chew every 4 hours PRN Gas  sodium chloride 0.65% Nasal 1 Spray(s) Both Nostrils four times a day PRN Nasal Congestion      LABS:  Blood type: A Negative  Rubella IgG: RPR: Negative                          9.0<L>   11.12<H> >-----------< 100<L>    ( 05-24 @ 11:25 )             27.6<L>                        8.7<L>   8.90 >-----------< 95<L>    ( 05-24 @ 06:11 )             26.5<L>                        8.3<L>   9.37 >-----------< 90<L>    ( 05-23 @ 06:49 )             25.2<L>                        8.8<L>   11.50<H> >-----------< 77<L>    ( 05-22 @ 19:00 )             26.4<L>                        9.2<L>   12.35<H> >-----------< 69<L>    ( 05-22 @ 12:21 )             27.6<L>                        10.4<L>   12.68<H> >-----------< 66<L>    ( 05-22 @ 07:14 )             31.7<L>    05-24-21 @ 11:25      136  |  101  |  12  ----------------------------<  98  4.2   |  21<L>  |  0.50    05-24-21 @ 06:10      135  |  101  |  10  ----------------------------<  69<L>  4.2   |  22  |  0.48<L>    05-23-21 @ 06:47      135  |  103  |  15  ----------------------------<  71  4.1   |  20<L>  |  0.57    05-22-21 @ 19:00      128<L>  |  96  |  17  ----------------------------<  97  4.7   |  19<L>  |  0.61    05-22-21 @ 12:21      128<L>  |  96  |  16  ----------------------------<  113<H>  5.2   |  18<L>  |  0.62    05-22-21 @ 07:14      130<L>  |  98  |  17  ----------------------------<  104<H>  5.0   |  17<L>  |  0.56        Ca    9.7      24 May 2021 11:25  Ca    9.2      24 May 2021 06:10  Ca    7.8<L>      23 May 2021 06:47  Ca    7.8<L>      22 May 2021 19:00  Ca    8.3<L>      22 May 2021 12:21  Ca    9.0      22 May 2021 07:14  Mg     7.0<HH>     05-22  Mg     6.9<H>     05-22  Mg     6.2<H>     05-22    TPro  6.3  /  Alb  3.2<L>  /  TBili  0.3  /  DBili  x   /  AST  56<H>  /  ALT  90<H>  /  AlkPhos  83  05-24-21 @ 11:25  TPro  5.9<L>  /  Alb  3.0<L>  /  TBili  0.2  /  DBili  x   /  AST  52<H>  /  ALT  83<H>  /  AlkPhos  72  05-24-21 @ 06:10  TPro  5.4<L>  /  Alb  2.6<L>  /  TBili  0.1<L>  /  DBili  x   /  AST  65<H>  /  ALT  88<H>  /  AlkPhos  65  05-23-21 @ 06:47  TPro  5.5<L>  /  Alb  2.8<L>  /  TBili  0.2  /  DBili  x   /  AST  73<H>  /  ALT  79<H>  /  AlkPhos  72  05-22-21 @ 19:00  TPro  5.3<L>  /  Alb  2.7<L>  /  TBili  0.2  /  DBili  x   /  AST  57<H>  /  ALT  65<H>  /  AlkPhos  72  05-22-21 @ 12:21  TPro  5.7<L>  /  Alb  2.8<L>  /  TBili  0.2  /  DBili  x   /  AST  48<H>  /  ALT  53<H>  /  AlkPhos  83  05-22-21 @ 07:14      Physical exam:  Gen: NAD  Abdomen: Soft, nontender, no distension , firm uterine fundus at umbilicus.  Incision: Clean, dry, and intact   Pelvic: Normal lochia noted  Ext: No calf tenderness

## 2021-05-25 NOTE — DISCHARGE NOTE OB - HOSPITAL COURSE
The patient was admitted for super-imposed PEC w/ severe features. She was started on Procardia. During her stay, her platelets started to down-trend. She had elevated LFT and a crushing headache. She was delivered at 31wks for worsening siPEC. Post partum, Her procardia regiment was adjusted to 30/60XL. Her blood pressure were well controlled. Her labs were stable with up-trending platelets and down-trending LFT. She should follow up with clinic in 2-3days for a blood pressure check

## 2021-05-26 ENCOUNTER — NON-APPOINTMENT (OUTPATIENT)
Age: 36
End: 2021-05-26

## 2021-05-28 DIAGNOSIS — O09.93 SUPERVISION OF HIGH RISK PREGNANCY, UNSPECIFIED, THIRD TRIMESTER: ICD-10-CM

## 2021-06-01 PROBLEM — I10 ESSENTIAL (PRIMARY) HYPERTENSION: Chronic | Status: ACTIVE | Noted: 2021-05-14

## 2021-06-04 ENCOUNTER — APPOINTMENT (OUTPATIENT)
Dept: MATERNAL FETAL MEDICINE | Facility: CLINIC | Age: 36
End: 2021-06-04
Payer: MEDICAID

## 2021-06-04 ENCOUNTER — OUTPATIENT (OUTPATIENT)
Dept: OUTPATIENT SERVICES | Facility: HOSPITAL | Age: 36
LOS: 1 days | End: 2021-06-04
Payer: MEDICAID

## 2021-06-04 VITALS
WEIGHT: 145 LBS | OXYGEN SATURATION: 97 % | HEART RATE: 91 BPM | TEMPERATURE: 98.6 F | HEIGHT: 60 IN | BODY MASS INDEX: 28.47 KG/M2 | SYSTOLIC BLOOD PRESSURE: 112 MMHG | DIASTOLIC BLOOD PRESSURE: 80 MMHG

## 2021-06-04 DIAGNOSIS — O09.899 SUPERVISION OF OTHER HIGH RISK PREGNANCIES, UNSPECIFIED TRIMESTER: ICD-10-CM

## 2021-06-04 PROCEDURE — 90471 IMMUNIZATION ADMIN: CPT

## 2021-06-04 PROCEDURE — G0463: CPT

## 2021-06-04 PROCEDURE — 99212 OFFICE O/P EST SF 10 MIN: CPT | Mod: 25,GC

## 2021-06-04 PROCEDURE — 90471 IMMUNIZATION ADMIN: CPT | Mod: NC

## 2021-06-04 RX ADMIN — MEDROXYPROGESTERONE ACETATE 0 MG/ML: 150 INJECTION, SUSPENSION INTRAMUSCULAR at 00:00

## 2021-06-09 LAB — SURGICAL PATHOLOGY STUDY: SIGNIFICANT CHANGE UP

## 2021-06-14 NOTE — HISTORY OF PRESENT ILLNESS
[Postpartum Follow Up] : postpartum follow up [Complications:___] : complications include: [unfilled] [Delivery Date: ___] : on [unfilled] [Repeat C/S] : delivered by  section (repeat) [Female] : Delivery History: baby girl [NICU: ___] : NICU: [unfilled] [Breastfeeding] : currently nursing [Intended Contraception] : Intended Contraception: [Medroxyprogesterone Injection] : medroxyprogesterone acetate injection [Fatigue] : fatigue [Clean/Dry/Intact] : clean, dry and intact [Healed] : healed [Mild] : mild vaginal bleeding [Not Done] : Examination of breasts not done [Doing Well] : is doing well [No Sign of Infection] : is showing no signs of infection [Excellent Pain Control] : has excellent pain control [BTL] : no tubal ligation [Resumed Menses] : has not resumed her menses [Resumed [place]] : has not resumed intercourse [Abdominal Pain] : no abdominal pain [Back Pain] : no back pain [Chest Pain] : no chest pain [S/Sx PP Depression] : no signs/symptoms of postpartum depression [Heavy Bleeding] : no heavy bleeding [Incisional Pain] : no incisional pain [Shortness of Breath] : no shortness of breath [Chills] : no chills [Fever] : no fever [Headache] : no headache [Nausea] : no nausea [Vomiting] : no vomiting [Erythema] : not erythematous [Swelling] : not swollen [Dehiscence] : not dehisced [FreeTextEntry8] : 2 week follow up s/p rLTCS for siPEC with SF. Patient doing and feeling well. No complaints at this time. [de-identified] : 35yo  POD#14 s/p rLTCS for siPEC with SF. Patient's BP well controlled and patient doing well post-op. [de-identified] : RTC in 4 weeks, Depo today. F/u with CardioOB   Patient seen with Dr. Barnard and BRITNEY Valle MFM Fellow

## 2021-06-16 DIAGNOSIS — O11.9 PRE-EXISTING HYPERTENSION WITH PRE-ECLAMPSIA, UNSPECIFIED TRIMESTER: ICD-10-CM

## 2021-06-16 DIAGNOSIS — Z30.013 ENCOUNTER FOR INITIAL PRESCRIPTION OF INJECTABLE CONTRACEPTIVE: ICD-10-CM

## 2021-06-22 PROCEDURE — G0463: CPT

## 2021-06-22 PROCEDURE — C1889: CPT

## 2021-06-22 PROCEDURE — 83735 ASSAY OF MAGNESIUM: CPT

## 2021-06-22 PROCEDURE — 84443 ASSAY THYROID STIM HORMONE: CPT

## 2021-06-22 PROCEDURE — 84550 ASSAY OF BLOOD/URIC ACID: CPT

## 2021-06-22 PROCEDURE — 85384 FIBRINOGEN ACTIVITY: CPT

## 2021-06-22 PROCEDURE — 86022 PLATELET ANTIBODIES: CPT

## 2021-06-22 PROCEDURE — 86780 TREPONEMA PALLIDUM: CPT

## 2021-06-22 PROCEDURE — 59025 FETAL NON-STRESS TEST: CPT

## 2021-06-22 PROCEDURE — 82962 GLUCOSE BLOOD TEST: CPT

## 2021-06-22 PROCEDURE — 85730 THROMBOPLASTIN TIME PARTIAL: CPT

## 2021-06-22 PROCEDURE — 59050 FETAL MONITOR W/REPORT: CPT

## 2021-06-22 PROCEDURE — 85460 HEMOGLOBIN FETAL: CPT

## 2021-06-22 PROCEDURE — 88307 TISSUE EXAM BY PATHOLOGIST: CPT

## 2021-06-22 PROCEDURE — 83615 LACTATE (LD) (LDH) ENZYME: CPT

## 2021-06-22 PROCEDURE — 80053 COMPREHEN METABOLIC PANEL: CPT

## 2021-06-22 PROCEDURE — 82570 ASSAY OF URINE CREATININE: CPT

## 2021-06-22 PROCEDURE — 82728 ASSAY OF FERRITIN: CPT

## 2021-06-22 PROCEDURE — 86870 RBC ANTIBODY IDENTIFICATION: CPT

## 2021-06-22 PROCEDURE — 83540 ASSAY OF IRON: CPT

## 2021-06-22 PROCEDURE — 86901 BLOOD TYPING SEROLOGIC RH(D): CPT

## 2021-06-22 PROCEDURE — 85025 COMPLETE CBC W/AUTO DIFF WBC: CPT

## 2021-06-22 PROCEDURE — 87389 HIV-1 AG W/HIV-1&-2 AB AG IA: CPT

## 2021-06-22 PROCEDURE — 86769 SARS-COV-2 COVID-19 ANTIBODY: CPT

## 2021-06-22 PROCEDURE — 84156 ASSAY OF PROTEIN URINE: CPT

## 2021-06-22 PROCEDURE — 85610 PROTHROMBIN TIME: CPT

## 2021-06-22 PROCEDURE — 87635 SARS-COV-2 COVID-19 AMP PRB: CPT

## 2021-06-22 PROCEDURE — 86900 BLOOD TYPING SEROLOGIC ABO: CPT

## 2021-06-22 PROCEDURE — 84439 ASSAY OF FREE THYROXINE: CPT

## 2021-06-22 PROCEDURE — 93306 TTE W/DOPPLER COMPLETE: CPT

## 2021-06-22 PROCEDURE — 87653 STREP B DNA AMP PROBE: CPT

## 2021-06-22 PROCEDURE — 83550 IRON BINDING TEST: CPT

## 2021-06-22 PROCEDURE — 86850 RBC ANTIBODY SCREEN: CPT

## 2021-06-22 PROCEDURE — 93970 EXTREMITY STUDY: CPT

## 2021-07-02 ENCOUNTER — APPOINTMENT (OUTPATIENT)
Dept: MATERNAL FETAL MEDICINE | Facility: CLINIC | Age: 36
End: 2021-07-02
Payer: MEDICAID

## 2021-07-02 ENCOUNTER — LABORATORY RESULT (OUTPATIENT)
Age: 36
End: 2021-07-02

## 2021-07-02 ENCOUNTER — OUTPATIENT (OUTPATIENT)
Dept: OUTPATIENT SERVICES | Facility: HOSPITAL | Age: 36
LOS: 1 days | End: 2021-07-02
Payer: MEDICAID

## 2021-07-02 ENCOUNTER — NON-APPOINTMENT (OUTPATIENT)
Age: 36
End: 2021-07-02

## 2021-07-02 VITALS
SYSTOLIC BLOOD PRESSURE: 142 MMHG | HEIGHT: 60 IN | OXYGEN SATURATION: 99 % | HEART RATE: 78 BPM | DIASTOLIC BLOOD PRESSURE: 96 MMHG | TEMPERATURE: 98.4 F

## 2021-07-02 DIAGNOSIS — O09.899 SUPERVISION OF OTHER HIGH RISK PREGNANCIES, UNSPECIFIED TRIMESTER: ICD-10-CM

## 2021-07-02 PROCEDURE — 99212 OFFICE O/P EST SF 10 MIN: CPT | Mod: GC

## 2021-07-02 PROCEDURE — 87625 HPV TYPES 16 & 18 ONLY: CPT

## 2021-07-02 PROCEDURE — 87624 HPV HI-RISK TYP POOLED RSLT: CPT

## 2021-07-02 PROCEDURE — G0463: CPT

## 2021-07-02 RX ORDER — NIFEDIPINE 30 MG/1
30 TABLET, EXTENDED RELEASE ORAL DAILY
Qty: 60 | Refills: 1 | Status: ACTIVE | COMMUNITY
Start: 2021-07-02 | End: 1900-01-01

## 2021-07-02 RX ORDER — NIFEDIPINE 60 MG/1
60 TABLET, EXTENDED RELEASE ORAL DAILY
Qty: 60 | Refills: 1 | Status: ACTIVE | COMMUNITY
Start: 2021-07-02 | End: 1900-01-01

## 2021-07-03 LAB
HPV GENOTYPE 16: SIGNIFICANT CHANGE UP
HPV GENOTYPE 18/45: SIGNIFICANT CHANGE UP
HPV HIGH+LOW RISK DNA PNL CVX: DETECTED

## 2021-07-04 ENCOUNTER — NON-APPOINTMENT (OUTPATIENT)
Age: 36
End: 2021-07-04

## 2021-07-08 LAB — CYTOLOGY SPEC DOC CYTO: SIGNIFICANT CHANGE UP

## 2021-07-11 NOTE — HISTORY OF PRESENT ILLNESS
[Postpartum Follow Up] : postpartum follow up [Complications:___] : complications include: [unfilled] [Breastfeeding] : currently nursing [None] : No associated symptoms are reported [Healed] : healed [Back to Normal] : is back to normal in size [Mild] : mild vaginal bleeding [Cervix Sample Taken] : cervical sample taken for a Pap smear [Examination Of The Breasts] : breasts are normal [Doing Well] : is doing well [No Sign of Infection] : is showing no signs of infection [Abdominal Pain] : no abdominal pain [Back Pain] : no back pain [Breast Pain] : no breast pain [BreastFeeding Problems] : no breastfeeding problems [Erythema] : not erythematous [Swelling] : not swollen [FreeTextEntry9] : sp rLTCS at 31 wk for siPEC [de-identified] : The patient is being seen for  2 week follow up s/p rLTCS for siPEC with SF. Patient doing and feeling well. No complaints at this time. \par \par History of Present Illness: \par \par Prenatal History: complications include: cHTN, siPEC, HLD \par Delivery History: baby girl  delivered by  section (repeat) on 21. NICU: currently admitted No tubal ligation. \par Current Status: She is currently nursing. She has not resumed her menses, has not resumed intercourse. Intended Contraception: medroxyprogesterone acetate injection. \par Symptoms: no abdominal pain, no back pain, no chest pain, no signs/symptoms of postpartum depression, no heavy bleeding, no incisional pain and no shortness of breath. \par Associated symptoms: fatigue, but no chills, no fever, no headache, no nausea and no vomiting. \par \par  [de-identified] : Pt received Depo at last visit. Continuing on Procardia 30/60. Appt with Cardiology in October for follow up. [place] BPs at home.  [de-identified] : Continue Depo - Return in 2 mo for repeat dose. Continue Procardia 30/60 - Rx sent today for refill. Pt to continue monitoring BPs.

## 2021-09-03 ENCOUNTER — APPOINTMENT (OUTPATIENT)
Dept: OBGYN | Facility: CLINIC | Age: 36
End: 2021-09-03
Payer: MEDICAID

## 2021-09-03 ENCOUNTER — OUTPATIENT (OUTPATIENT)
Dept: OUTPATIENT SERVICES | Facility: HOSPITAL | Age: 36
LOS: 1 days | End: 2021-09-03
Payer: MEDICAID

## 2021-09-03 DIAGNOSIS — Z30.013 ENCOUNTER FOR INITIAL PRESCRIPTION OF INJECTABLE CONTRACEPTIVE: ICD-10-CM

## 2021-09-03 DIAGNOSIS — N76.0 ACUTE VAGINITIS: ICD-10-CM

## 2021-09-03 PROCEDURE — 90472 IMMUNIZATION ADMIN EACH ADD: CPT

## 2021-09-03 PROCEDURE — 90472 IMMUNIZATION ADMIN EACH ADD: CPT | Mod: NC

## 2021-09-03 RX ADMIN — MEDROXYPROGESTERONE ACETATE 0 MG/ML: 150 INJECTION, SUSPENSION INTRAMUSCULAR at 00:00

## 2021-09-29 RX ORDER — MEDROXYPROGESTERONE ACETATE 150 MG/ML
150 INJECTION, SUSPENSION INTRAMUSCULAR
Qty: 0 | Refills: 0 | Status: COMPLETED | OUTPATIENT
Start: 2021-09-03

## 2021-10-29 ENCOUNTER — APPOINTMENT (OUTPATIENT)
Dept: CARDIOLOGY | Facility: CLINIC | Age: 36
End: 2021-10-29
Payer: MEDICAID

## 2021-10-29 ENCOUNTER — NON-APPOINTMENT (OUTPATIENT)
Age: 36
End: 2021-10-29

## 2021-10-29 VITALS
SYSTOLIC BLOOD PRESSURE: 125 MMHG | BODY MASS INDEX: 26.31 KG/M2 | HEIGHT: 60 IN | HEART RATE: 140 BPM | DIASTOLIC BLOOD PRESSURE: 88 MMHG | WEIGHT: 134 LBS | RESPIRATION RATE: 16 BRPM | OXYGEN SATURATION: 99 %

## 2021-10-29 DIAGNOSIS — E78.01 FAMILIAL HYPERCHOLESTEROLEMIA: ICD-10-CM

## 2021-10-29 DIAGNOSIS — R00.0 TACHYCARDIA, UNSPECIFIED: ICD-10-CM

## 2021-10-29 PROCEDURE — 99214 OFFICE O/P EST MOD 30 MIN: CPT

## 2021-10-29 PROCEDURE — 93000 ELECTROCARDIOGRAM COMPLETE: CPT

## 2022-02-10 ENCOUNTER — APPOINTMENT (OUTPATIENT)
Dept: OBGYN | Facility: CLINIC | Age: 37
End: 2022-02-10
Payer: MEDICAID

## 2022-02-10 ENCOUNTER — OUTPATIENT (OUTPATIENT)
Dept: OUTPATIENT SERVICES | Facility: HOSPITAL | Age: 37
LOS: 1 days | End: 2022-02-10
Payer: MEDICAID

## 2022-02-10 DIAGNOSIS — N76.0 ACUTE VAGINITIS: ICD-10-CM

## 2022-02-10 PROCEDURE — 81025 URINE PREGNANCY TEST: CPT

## 2022-02-10 PROCEDURE — G0463: CPT

## 2022-02-10 PROCEDURE — 90471 IMMUNIZATION ADMIN: CPT

## 2022-02-10 PROCEDURE — ZZZZZ: CPT

## 2022-02-10 RX ORDER — MEDROXYPROGESTERONE ACETATE 150 MG/ML
150 INJECTION, SUSPENSION INTRAMUSCULAR
Qty: 0 | Refills: 0 | Status: COMPLETED | OUTPATIENT
Start: 2021-06-04

## 2022-02-10 RX ADMIN — MEDROXYPROGESTERONE ACETATE 0 MG/ML: 150 INJECTION, SUSPENSION INTRAMUSCULAR at 00:00

## 2022-02-16 DIAGNOSIS — Z30.42 ENCOUNTER FOR SURVEILLANCE OF INJECTABLE CONTRACEPTIVE: ICD-10-CM

## 2022-05-02 ENCOUNTER — LABORATORY RESULT (OUTPATIENT)
Age: 37
End: 2022-05-02

## 2022-05-02 ENCOUNTER — OUTPATIENT (OUTPATIENT)
Dept: OUTPATIENT SERVICES | Facility: HOSPITAL | Age: 37
LOS: 1 days | End: 2022-05-02
Payer: MEDICAID

## 2022-05-02 ENCOUNTER — APPOINTMENT (OUTPATIENT)
Dept: OBGYN | Facility: CLINIC | Age: 37
End: 2022-05-02
Payer: MEDICAID

## 2022-05-02 ENCOUNTER — RESULT CHARGE (OUTPATIENT)
Age: 37
End: 2022-05-02

## 2022-05-02 VITALS — DIASTOLIC BLOOD PRESSURE: 90 MMHG | SYSTOLIC BLOOD PRESSURE: 140 MMHG | WEIGHT: 140 LBS | BODY MASS INDEX: 27.34 KG/M2

## 2022-05-02 DIAGNOSIS — O26.899 OTHER SPECIFIED PREGNANCY RELATED CONDITIONS, UNSPECIFIED TRIMESTER: ICD-10-CM

## 2022-05-02 DIAGNOSIS — Z3A.26 26 WEEKS GESTATION OF PREGNANCY: ICD-10-CM

## 2022-05-02 DIAGNOSIS — Z67.91 OTHER SPECIFIED PREGNANCY RELATED CONDITIONS, UNSPECIFIED TRIMESTER: ICD-10-CM

## 2022-05-02 DIAGNOSIS — N76.0 ACUTE VAGINITIS: ICD-10-CM

## 2022-05-02 DIAGNOSIS — O09.299 SUPERVISION OF PREGNANCY WITH OTHER POOR REPRODUCTIVE OR OBSTETRIC HISTORY, UNSPECIFIED TRIMESTER: ICD-10-CM

## 2022-05-02 LAB
CANDIDA AB TITR SER: SIGNIFICANT CHANGE UP
G VAGINALIS DNA SPEC QL NAA+PROBE: SIGNIFICANT CHANGE UP
HCG UR QL: NEGATIVE
QUALITY CONTROL: YES
T VAGINALIS SPEC QL WET PREP: SIGNIFICANT CHANGE UP

## 2022-05-02 PROCEDURE — 99213 OFFICE O/P EST LOW 20 MIN: CPT | Mod: 25

## 2022-05-02 PROCEDURE — 36415 COLL VENOUS BLD VENIPUNCTURE: CPT

## 2022-05-02 PROCEDURE — 81002 URINALYSIS NONAUTO W/O SCOPE: CPT

## 2022-05-02 PROCEDURE — G0463: CPT

## 2022-05-02 PROCEDURE — 87800 DETECT AGNT MULT DNA DIREC: CPT

## 2022-05-02 PROCEDURE — 81025 URINE PREGNANCY TEST: CPT

## 2022-05-02 RX ORDER — MEDROXYPROGESTERONE ACETATE 150 MG/ML
150 INJECTION, SUSPENSION INTRAMUSCULAR
Qty: 0 | Refills: 0 | Status: COMPLETED | OUTPATIENT
Start: 2022-02-10

## 2022-05-02 RX ORDER — FLUCONAZOLE 150 MG/1
150 TABLET ORAL
Qty: 3 | Refills: 3 | Status: ACTIVE | COMMUNITY
Start: 2022-05-02 | End: 1900-01-01

## 2022-05-02 RX ADMIN — MEDROXYPROGESTERONE ACETATE 0 MG/ML: 150 INJECTION, SUSPENSION INTRAMUSCULAR at 00:00

## 2022-05-02 NOTE — PHYSICAL EXAM
[Labia Majora] : normal [Labia Minora] : normal [No Bleeding] : There was no active vaginal bleeding [Normal] : normal [Tenderness] : nontender [FreeTextEntry4] : +thick white creamy discharge vs suppository remnants.  Nitrazine neg.

## 2022-05-02 NOTE — DISCUSSION/SUMMARY
[FreeTextEntry1] : 37yo P2-  vaginal itching, consistent with yeast\par -[ ]affirm sent ( pt used multiple OTC/ foreign products- unable to discern discharge from inserts\par - Diflucan rx sent\par - preg test neg [ ]due for DEPO\par \par RTC 12 wks for DEPO\par [ ]7/2022 for annual/ rpt pap\par \par Gentry Madrigal,, PAC

## 2022-05-02 NOTE — HISTORY OF PRESENT ILLNESS
[FreeTextEntry1] : 35yo  (LMP-  amenn/ DEPO) presents complaining of several days of severe vaginal itching/ thick white/ green d/c.  Denies odor/ dysuria.  Pt used "cream from [place] and and ov[place]" - some improvement, but still very sensitive/ itchy. \par \par - PAP   NEG +HPV  [ ]due 2022\par

## 2022-05-04 DIAGNOSIS — N89.8 OTHER SPECIFIED NONINFLAMMATORY DISORDERS OF VAGINA: ICD-10-CM

## 2022-05-04 DIAGNOSIS — Z30.42 ENCOUNTER FOR SURVEILLANCE OF INJECTABLE CONTRACEPTIVE: ICD-10-CM

## 2022-05-20 NOTE — PROGRESS NOTE ADULT - ASSESSMENT
20-May-2022 07:31 36 year old female,  at 31 weeks admitted with preeclampsia by severe ranging BPs and worsening proteinuria. Patient previously tachy to 110s-120s. Tachycardia improved. BP mildly elevated yesterday, procardia increased to 60. Patient denies symptoms of preeclampsia.     Pregnancy induced Hypertension  Blood pressure within range this AM  Continue on Procardia XL 60 mg daily  Maintain K >4.0 and Mg >1.8    36 year old female,  at 31 weeks admitted with preeclampsia by severe ranging BPs and worsening proteinuria. Patient previously tachy to 110s-120s. Tachycardia improved currently 104 bpm. BP mildly elevated yesterday, procardia increased to 60. Patient denies symptoms of preeclampsia.     Pregnancy induced Hypertension  Blood pressure within range this AM  Continue on Procardia XL 60 mg daily  Maintain K >4.0 and Mg >1.8       Thank you .   DAMON BassettC

## 2022-08-02 ENCOUNTER — APPOINTMENT (OUTPATIENT)
Dept: OBGYN | Facility: CLINIC | Age: 37
End: 2022-08-02

## 2022-08-02 ENCOUNTER — OUTPATIENT (OUTPATIENT)
Dept: OUTPATIENT SERVICES | Facility: HOSPITAL | Age: 37
LOS: 1 days | End: 2022-08-02
Payer: MEDICAID

## 2022-08-02 ENCOUNTER — RESULT CHARGE (OUTPATIENT)
Age: 37
End: 2022-08-02

## 2022-08-02 DIAGNOSIS — N76.0 ACUTE VAGINITIS: ICD-10-CM

## 2022-08-02 LAB
HCG UR QL: NEGATIVE
QUALITY CONTROL: YES

## 2022-08-02 PROCEDURE — 96372 THER/PROPH/DIAG INJ SC/IM: CPT | Mod: NC

## 2022-08-02 PROCEDURE — 96372 THER/PROPH/DIAG INJ SC/IM: CPT

## 2022-08-02 PROCEDURE — 81025 URINE PREGNANCY TEST: CPT

## 2022-08-02 RX ORDER — MEDROXYPROGESTERONE ACETATE 150 MG/ML
150 INJECTION, SUSPENSION INTRAMUSCULAR
Qty: 0 | Refills: 0 | Status: COMPLETED | OUTPATIENT
Start: 2022-08-02

## 2022-08-02 RX ADMIN — MEDROXYPROGESTERONE ACETATE 150 MG/ML: 150 INJECTION, SUSPENSION INTRAMUSCULAR at 00:00

## 2022-08-05 DIAGNOSIS — Z30.42 ENCOUNTER FOR SURVEILLANCE OF INJECTABLE CONTRACEPTIVE: ICD-10-CM

## 2022-12-21 NOTE — OB RN TRIAGE NOTE - CURRENT PREGNANCY COMPLICATIONS, OB PROFILE
Patient states having palpitations . Please call as she needs to take deep breathes to help ease issue. Hypertensive Disorder

## 2023-05-22 NOTE — OB RN PATIENT PROFILE - EDUCATION PROVIDED ON ASSESSMENT OF INFANT "FEEDING CUES" AND THE IMPORTANCE OF FEEDING "ON CUE" / "BABY-LED" FEEDINGS
Extraction Method: sterile needle Render Post-Care Instructions In Note?: no Post-Care Instructions: I reviewed with the patient in detail post-care instructions. Patient is to keep the treatment areas dry overnight, and then apply bacitracin twice daily until healed. Patient may apply hydrogen peroxide soaks to remove any crusting. Detail Level: Detailed Acne Type: Comedonal Lesions Consent was obtained and risks were reviewed including but not limited to scarring, infection, bleeding, scabbing, incomplete removal, and allergy to anesthesia. Prep Text (Optional): Prior to removal the treatment areas were prepped in the usual fashion. Statement Selected

## 2023-12-06 ENCOUNTER — LABORATORY RESULT (OUTPATIENT)
Age: 38
End: 2023-12-06

## 2023-12-06 ENCOUNTER — OUTPATIENT (OUTPATIENT)
Dept: OUTPATIENT SERVICES | Facility: HOSPITAL | Age: 38
LOS: 1 days | End: 2023-12-06
Payer: MEDICAID

## 2023-12-06 ENCOUNTER — APPOINTMENT (OUTPATIENT)
Dept: OBGYN | Facility: CLINIC | Age: 38
End: 2023-12-06
Payer: MEDICAID

## 2023-12-06 VITALS — SYSTOLIC BLOOD PRESSURE: 136 MMHG | WEIGHT: 138 LBS | BODY MASS INDEX: 26.95 KG/M2 | DIASTOLIC BLOOD PRESSURE: 80 MMHG

## 2023-12-06 DIAGNOSIS — Z30.013 ENCOUNTER FOR INITIAL PRESCRIPTION OF INJECTABLE CONTRACEPTIVE: ICD-10-CM

## 2023-12-06 DIAGNOSIS — Z11.3 ENCOUNTER FOR SCREENING FOR INFECTIONS WITH A PREDOMINANTLY SEXUAL MODE OF TRANSMISSION: ICD-10-CM

## 2023-12-06 DIAGNOSIS — Z30.42 ENCOUNTER FOR SURVEILLANCE OF INJECTABLE CONTRACEPTIVE: ICD-10-CM

## 2023-12-06 DIAGNOSIS — N89.8 OTHER SPECIFIED NONINFLAMMATORY DISORDERS OF VAGINA: ICD-10-CM

## 2023-12-06 DIAGNOSIS — B96.89 ACUTE VAGINITIS: ICD-10-CM

## 2023-12-06 DIAGNOSIS — N76.0 ACUTE VAGINITIS: ICD-10-CM

## 2023-12-06 DIAGNOSIS — Z01.419 ENCOUNTER FOR GYNECOLOGICAL EXAMINATION (GENERAL) (ROUTINE) W/OUT ABNORMAL FINDINGS: ICD-10-CM

## 2023-12-06 DIAGNOSIS — B37.31 ACUTE CANDIDIASIS OF VULVA AND VAGINA: ICD-10-CM

## 2023-12-06 PROCEDURE — G0463: CPT

## 2023-12-06 PROCEDURE — 87625 HPV TYPES 16 & 18 ONLY: CPT

## 2023-12-06 PROCEDURE — 87491 CHLMYD TRACH DNA AMP PROBE: CPT

## 2023-12-06 PROCEDURE — 87591 N.GONORRHOEAE DNA AMP PROB: CPT

## 2023-12-06 PROCEDURE — 99395 PREV VISIT EST AGE 18-39: CPT

## 2023-12-06 RX ORDER — TERCONAZOLE 8 MG/G
0.8 CREAM VAGINAL DAILY
Qty: 1 | Refills: 0 | Status: ACTIVE | COMMUNITY
Start: 2023-12-06 | End: 1900-01-01

## 2023-12-06 RX ORDER — FLUCONAZOLE 150 MG/1
150 TABLET ORAL
Qty: 2 | Refills: 0 | Status: ACTIVE | COMMUNITY
Start: 2023-12-06 | End: 1900-01-01

## 2023-12-07 PROBLEM — N76.0 BACTERIAL VAGINOSIS: Status: ACTIVE | Noted: 2023-12-07 | Resolved: 2024-01-06

## 2023-12-07 LAB
C TRACH RRNA SPEC QL NAA+PROBE: SIGNIFICANT CHANGE UP
C TRACH RRNA SPEC QL NAA+PROBE: SIGNIFICANT CHANGE UP
CANDIDA AB TITR SER: DETECTED
CANDIDA AB TITR SER: DETECTED
G VAGINALIS DNA SPEC QL NAA+PROBE: DETECTED
G VAGINALIS DNA SPEC QL NAA+PROBE: DETECTED
HPV HIGH+LOW RISK DNA PNL CVX: DETECTED
HPV HIGH+LOW RISK DNA PNL CVX: DETECTED
N GONORRHOEA RRNA SPEC QL NAA+PROBE: SIGNIFICANT CHANGE UP
N GONORRHOEA RRNA SPEC QL NAA+PROBE: SIGNIFICANT CHANGE UP
SPECIMEN SOURCE: SIGNIFICANT CHANGE UP
SPECIMEN SOURCE: SIGNIFICANT CHANGE UP
T PALLIDUM AB TITR SER: NEGATIVE — SIGNIFICANT CHANGE UP
T PALLIDUM AB TITR SER: NEGATIVE — SIGNIFICANT CHANGE UP
T VAGINALIS SPEC QL WET PREP: SIGNIFICANT CHANGE UP
T VAGINALIS SPEC QL WET PREP: SIGNIFICANT CHANGE UP

## 2023-12-07 RX ORDER — METRONIDAZOLE 7.5 MG/G
0.75 GEL VAGINAL
Qty: 1 | Refills: 2 | Status: ACTIVE | COMMUNITY
Start: 2023-12-07 | End: 1900-01-01

## 2023-12-08 LAB
HPV GENOTYPE 16: SIGNIFICANT CHANGE UP
HPV GENOTYPE 16: SIGNIFICANT CHANGE UP
HPV GENOTYPE 18/45: SIGNIFICANT CHANGE UP
HPV GENOTYPE 18/45: SIGNIFICANT CHANGE UP

## 2023-12-09 LAB
CYTOLOGY SPEC DOC CYTO: SIGNIFICANT CHANGE UP
CYTOLOGY SPEC DOC CYTO: SIGNIFICANT CHANGE UP

## 2023-12-20 DIAGNOSIS — Z01.419 ENCOUNTER FOR GYNECOLOGICAL EXAMINATION (GENERAL) (ROUTINE) WITHOUT ABNORMAL FINDINGS: ICD-10-CM

## 2023-12-20 DIAGNOSIS — N89.8 OTHER SPECIFIED NONINFLAMMATORY DISORDERS OF VAGINA: ICD-10-CM

## 2023-12-20 DIAGNOSIS — Z11.3 ENCOUNTER FOR SCREENING FOR INFECTIONS WITH A PREDOMINANTLY SEXUAL MODE OF TRANSMISSION: ICD-10-CM

## 2023-12-20 DIAGNOSIS — B37.31 ACUTE CANDIDIASIS OF VULVA AND VAGINA: ICD-10-CM

## 2024-02-09 RX ORDER — TERCONAZOLE 4 MG/G
0.4 CREAM VAGINAL
Qty: 2 | Refills: 0 | Status: ACTIVE | COMMUNITY
Start: 2024-02-09 | End: 1900-01-01

## 2024-07-17 ENCOUNTER — APPOINTMENT (OUTPATIENT)
Dept: OBGYN | Facility: CLINIC | Age: 39
End: 2024-07-17
Payer: COMMERCIAL

## 2024-07-17 ENCOUNTER — APPOINTMENT (OUTPATIENT)
Dept: OBGYN | Facility: CLINIC | Age: 39
End: 2024-07-17

## 2024-07-17 DIAGNOSIS — N93.9 ABNORMAL UTERINE AND VAGINAL BLEEDING, UNSPECIFIED: ICD-10-CM

## 2024-07-17 PROCEDURE — 99204 OFFICE O/P NEW MOD 45 MIN: CPT

## 2024-07-17 PROCEDURE — 76830 TRANSVAGINAL US NON-OB: CPT | Mod: 26

## 2024-07-18 LAB
C TRACH RRNA SPEC QL NAA+PROBE: NOT DETECTED
HCG SERPL-MCNC: 1 MIU/ML
N GONORRHOEA RRNA SPEC QL NAA+PROBE: NOT DETECTED
SOURCE AMPLIFICATION: NORMAL

## 2024-07-31 ENCOUNTER — APPOINTMENT (OUTPATIENT)
Dept: ANTEPARTUM | Facility: CLINIC | Age: 39
End: 2024-07-31

## 2024-07-31 PROCEDURE — 76830 TRANSVAGINAL US NON-OB: CPT

## 2025-05-13 NOTE — OB RN PATIENT PROFILE - AS [facility]
** 1st Attempt **    Attempted to contact the patient via telephone; however there was no answer. A message was left requesting a call back.     Carlene Jean-Baptiste RN     (4) no limitation